# Patient Record
Sex: MALE | Race: WHITE | NOT HISPANIC OR LATINO | Employment: FULL TIME | URBAN - NONMETROPOLITAN AREA
[De-identification: names, ages, dates, MRNs, and addresses within clinical notes are randomized per-mention and may not be internally consistent; named-entity substitution may affect disease eponyms.]

---

## 2017-08-04 ENCOUNTER — HOSPITAL ENCOUNTER (EMERGENCY)
Facility: HOSPITAL | Age: 37
Discharge: HOME | End: 2017-08-05
Attending: EMERGENCY MEDICINE | Admitting: EMERGENCY MEDICINE

## 2017-08-04 DIAGNOSIS — T78.40XA ALLERGIC REACTION, INITIAL ENCOUNTER: Primary | ICD-10-CM

## 2017-08-04 PROCEDURE — 99284 EMERGENCY DEPT VISIT MOD MDM: CPT

## 2017-08-04 PROCEDURE — 96375 TX/PRO/DX INJ NEW DRUG ADDON: CPT

## 2017-08-04 PROCEDURE — 96374 THER/PROPH/DIAG INJ IV PUSH: CPT

## 2017-08-04 PROCEDURE — 25010000002 METHYLPREDNISOLONE PER 125 MG: Performed by: EMERGENCY MEDICINE

## 2017-08-04 PROCEDURE — 96361 HYDRATE IV INFUSION ADD-ON: CPT

## 2017-08-04 PROCEDURE — 25010000002 DIPHENHYDRAMINE PER 50 MG: Performed by: EMERGENCY MEDICINE

## 2017-08-04 RX ORDER — FAMOTIDINE 10 MG/ML
20 INJECTION, SOLUTION INTRAVENOUS ONCE
Status: COMPLETED | OUTPATIENT
Start: 2017-08-04 | End: 2017-08-04

## 2017-08-04 RX ORDER — DIPHENHYDRAMINE HYDROCHLORIDE 50 MG/ML
25 INJECTION INTRAMUSCULAR; INTRAVENOUS ONCE
Status: COMPLETED | OUTPATIENT
Start: 2017-08-04 | End: 2017-08-04

## 2017-08-04 RX ORDER — METHYLPREDNISOLONE SODIUM SUCCINATE 125 MG/2ML
125 INJECTION, POWDER, LYOPHILIZED, FOR SOLUTION INTRAMUSCULAR; INTRAVENOUS ONCE
Status: COMPLETED | OUTPATIENT
Start: 2017-08-04 | End: 2017-08-04

## 2017-08-04 RX ADMIN — SODIUM CHLORIDE 1000 ML: 9 INJECTION, SOLUTION INTRAVENOUS at 22:22

## 2017-08-04 RX ADMIN — FAMOTIDINE 20 MG: 10 INJECTION, SOLUTION INTRAVENOUS at 22:21

## 2017-08-04 RX ADMIN — DIPHENHYDRAMINE HYDROCHLORIDE 25 MG: 50 INJECTION, SOLUTION INTRAMUSCULAR; INTRAVENOUS at 22:21

## 2017-08-04 RX ADMIN — METHYLPREDNISOLONE SODIUM SUCCINATE 125 MG: 125 INJECTION, POWDER, FOR SOLUTION INTRAMUSCULAR; INTRAVENOUS at 22:21

## 2017-08-05 VITALS
RESPIRATION RATE: 18 BRPM | SYSTOLIC BLOOD PRESSURE: 107 MMHG | HEART RATE: 65 BPM | DIASTOLIC BLOOD PRESSURE: 78 MMHG | HEIGHT: 68 IN | WEIGHT: 150 LBS | BODY MASS INDEX: 22.73 KG/M2 | TEMPERATURE: 98.1 F | OXYGEN SATURATION: 99 %

## 2017-08-05 RX ORDER — PREDNISONE 20 MG/1
20 TABLET ORAL DAILY
Qty: 4 TABLET | Refills: 0 | OUTPATIENT
Start: 2017-08-05 | End: 2018-02-27

## 2017-08-05 NOTE — ED PROVIDER NOTES
TRIAGE CHIEF COMPLAINT:     Nursing and triage notes reviewed    Chief Complaint   Patient presents with   • Allergic Reaction      HPI: Chas Jj is a 37 y.o. male who presents to the emergency department complaining of An allergic reaction.  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  Patient states she has had allergic reactions to insulation in the past but states they've never been this bad.  Did not take any medications at home.  Denies chest pain.  Does describe some itching.  Denies other complaints currently.     REVIEW OF SYSTEMS: All other systems reviewed and are negative     PAST MEDICAL HISTORY:   History reviewed. No pertinent past medical history.     FAMILY HISTORY:   History reviewed. No pertinent family history.     SOCIAL HISTORY:   Social History     Social History   • Marital status: N/A     Spouse name: N/A   • Number of children: N/A   • Years of education: N/A     Occupational History   • Not on file.     Social History Main Topics   • Smoking status: Current Every Day Smoker     Packs/day: 0.50     Types: Cigarettes   • Smokeless tobacco: Not on file   • Alcohol use No   • Drug use: No   • Sexual activity: Not on file     Other Topics Concern   • Not on file     Social History Narrative   • No narrative on file        SURGICAL HISTORY:   Past Surgical History:   Procedure Laterality Date   • TOTAL HIP ARTHROPLASTY     • TOTAL KNEE ARTHROPLASTY          CURRENT MEDICATIONS:      Medication List      Notice     You have not been prescribed any medications.         ALLERGIES: Mushroom extract complex     PHYSICAL EXAM:   VITAL SIGNS:   Vitals:    08/04/17 2202   BP: 134/78   Pulse: 80   Resp: 20   Temp: 97.6 °F (36.4 °C)   SpO2: 99%      CONSTITUTIONAL: Awake, oriented, appears non-toxic   HENT: Atraumatic, normocephalic, oral mucosa pink and moist, airway  patent.   EYES: Conjunctiva clear  NECK: Trachea midline   CARDIOVASCULAR: Normal heart rate, Normal rhythm, No murmurs, rubs, gallops   PULMONARY/CHEST: Clear to auscultation, no rhonchi, wheezes, or rales. Symmetrical breath sounds   ABDOMINAL: Non-distended, soft, non-tender - no rebound or guarding   NEUROLOGIC: Non-focal, moving all four extremities, no gross sensory or motor deficits.   EXTREMITIES: No clubbing, cyanosis, or edema   SKIN: Warm, Dry, there are diffuse hives over the neck, trunk, back, upper and lower extremities.     ED COURSE / MEDICAL DECISION MAKING:   Chas Jj is a 37 y.o. male who presents to the emergency department for evaluation of an allergic reaction.  Patient arrives with stable vital signs.  Does not appear distressed.  Does have a large number of hives spread diffusely over his body.  No wheezing is appreciated, no respiratory compromise.  Patient treated with steroids, IV fluids, Benadryl, and Pepcid.  Patient had rapid resolution of his symptoms.  Patient observed several hours with no rebound of symptoms.  Patient feeling well at this point in time.  I think he is safe for discharged home.  We'll provide some steroids as an outpatient.  Return precautions discussed.    DECISION TO DISCHARGE/ADMIT: see ED care timeline     FINAL IMPRESSION:   1 -- allergic reaction   2 --   3 --     Electronically signed by: Neetu Avila MD, 8/4/2017 10:15 PM       Neetu Avila MD  08/05/17 0016

## 2017-08-28 ENCOUNTER — HOSPITAL ENCOUNTER (EMERGENCY)
Facility: HOSPITAL | Age: 37
Discharge: LEFT BEFORE DISCHARGE | End: 2017-08-28
Attending: EMERGENCY MEDICINE | Admitting: EMERGENCY MEDICINE

## 2017-08-28 VITALS
WEIGHT: 151 LBS | BODY MASS INDEX: 23.7 KG/M2 | TEMPERATURE: 98.2 F | RESPIRATION RATE: 16 BRPM | HEART RATE: 66 BPM | HEIGHT: 67 IN | OXYGEN SATURATION: 98 % | DIASTOLIC BLOOD PRESSURE: 79 MMHG | SYSTOLIC BLOOD PRESSURE: 112 MMHG

## 2017-08-28 DIAGNOSIS — F48.8 SYNCOPE, PSYCHOGENIC: Primary | ICD-10-CM

## 2017-08-28 LAB
ALBUMIN SERPL-MCNC: 4.4 G/DL (ref 3.5–5)
ALBUMIN/GLOB SERPL: 1.6 G/DL (ref 1–2)
ALP SERPL-CCNC: 89 U/L (ref 38–126)
ALT SERPL W P-5'-P-CCNC: 30 U/L (ref 13–69)
AMPHET+METHAMPHET UR QL: NEGATIVE
AMPHETAMINES UR QL: NEGATIVE
ANION GAP SERPL CALCULATED.3IONS-SCNC: 12.6 MMOL/L
AST SERPL-CCNC: 19 U/L (ref 15–46)
BARBITURATES UR QL SCN: NEGATIVE
BASOPHILS # BLD AUTO: 0.09 10*3/MM3 (ref 0–0.2)
BASOPHILS NFR BLD AUTO: 1.1 % (ref 0–2.5)
BENZODIAZ UR QL SCN: NEGATIVE
BILIRUB SERPL-MCNC: 0.4 MG/DL (ref 0.2–1.3)
BILIRUB UR QL STRIP: NEGATIVE
BUN BLD-MCNC: 14 MG/DL (ref 7–20)
BUN/CREAT SERPL: 15.6 (ref 6.3–21.9)
BUPRENORPHINE SERPL-MCNC: NEGATIVE NG/ML
CALCIUM SPEC-SCNC: 9 MG/DL (ref 8.4–10.2)
CANNABINOIDS SERPL QL: NEGATIVE
CHLORIDE SERPL-SCNC: 103 MMOL/L (ref 98–107)
CLARITY UR: CLEAR
CO2 SERPL-SCNC: 27 MMOL/L (ref 26–30)
COCAINE UR QL: NEGATIVE
COLOR UR: YELLOW
CREAT BLD-MCNC: 0.9 MG/DL (ref 0.6–1.3)
DEPRECATED RDW RBC AUTO: 44.9 FL (ref 37–54)
EOSINOPHIL # BLD AUTO: 0.17 10*3/MM3 (ref 0–0.7)
EOSINOPHIL NFR BLD AUTO: 2 % (ref 0–7)
ERYTHROCYTE [DISTWIDTH] IN BLOOD BY AUTOMATED COUNT: 13.4 % (ref 11.5–14.5)
ETHANOL BLD-MCNC: <10 MG/DL
ETHANOL UR QL: <0.01 %
GFR SERPL CREATININE-BSD FRML MDRD: 95 ML/MIN/1.73
GLOBULIN UR ELPH-MCNC: 2.7 GM/DL
GLUCOSE BLD-MCNC: 94 MG/DL (ref 74–98)
GLUCOSE UR STRIP-MCNC: NEGATIVE MG/DL
HCT VFR BLD AUTO: 42.7 % (ref 42–52)
HGB BLD-MCNC: 14.2 G/DL (ref 14–18)
HGB UR QL STRIP.AUTO: NEGATIVE
HOLD SPECIMEN: NORMAL
HOLD SPECIMEN: NORMAL
IMM GRANULOCYTES # BLD: 0.02 10*3/MM3 (ref 0–0.06)
IMM GRANULOCYTES NFR BLD: 0.2 % (ref 0–0.6)
KETONES UR QL STRIP: ABNORMAL
LEUKOCYTE ESTERASE UR QL STRIP.AUTO: NEGATIVE
LYMPHOCYTES # BLD AUTO: 2.25 10*3/MM3 (ref 0.6–3.4)
LYMPHOCYTES NFR BLD AUTO: 26.8 % (ref 10–50)
MAGNESIUM SERPL-MCNC: 2 MG/DL (ref 1.6–2.3)
MCH RBC QN AUTO: 30 PG (ref 27–31)
MCHC RBC AUTO-ENTMCNC: 33.3 G/DL (ref 30–37)
MCV RBC AUTO: 90.3 FL (ref 80–94)
METHADONE UR QL SCN: NEGATIVE
MONOCYTES # BLD AUTO: 0.8 10*3/MM3 (ref 0–0.9)
MONOCYTES NFR BLD AUTO: 9.5 % (ref 0–12)
NEUTROPHILS # BLD AUTO: 5.06 10*3/MM3 (ref 2–6.9)
NEUTROPHILS NFR BLD AUTO: 60.4 % (ref 37–80)
NITRITE UR QL STRIP: NEGATIVE
NRBC BLD MANUAL-RTO: 0 /100 WBC (ref 0–0)
OPIATES UR QL: NEGATIVE
OXYCODONE UR QL SCN: NEGATIVE
PCP UR QL SCN: NEGATIVE
PH UR STRIP.AUTO: 6 [PH] (ref 5–8)
PLATELET # BLD AUTO: 195 10*3/MM3 (ref 130–400)
PMV BLD AUTO: 12.6 FL (ref 6–12)
POTASSIUM BLD-SCNC: 3.6 MMOL/L (ref 3.5–5.1)
PROPOXYPH UR QL: NEGATIVE
PROT SERPL-MCNC: 7.1 G/DL (ref 6.3–8.2)
PROT UR QL STRIP: NEGATIVE
RBC # BLD AUTO: 4.73 10*6/MM3 (ref 4.7–6.1)
SODIUM BLD-SCNC: 139 MMOL/L (ref 137–145)
SP GR UR STRIP: 1.01 (ref 1–1.03)
TRICYCLICS UR QL SCN: NEGATIVE
TROPONIN I SERPL-MCNC: <0.012 NG/ML (ref 0–0.03)
UROBILINOGEN UR QL STRIP: ABNORMAL
WBC NRBC COR # BLD: 8.39 10*3/MM3 (ref 4.8–10.8)
WHOLE BLOOD HOLD SPECIMEN: NORMAL
WHOLE BLOOD HOLD SPECIMEN: NORMAL

## 2017-08-28 PROCEDURE — 84484 ASSAY OF TROPONIN QUANT: CPT | Performed by: EMERGENCY MEDICINE

## 2017-08-28 PROCEDURE — 99284 EMERGENCY DEPT VISIT MOD MDM: CPT

## 2017-08-28 PROCEDURE — 80307 DRUG TEST PRSMV CHEM ANLYZR: CPT | Performed by: EMERGENCY MEDICINE

## 2017-08-28 PROCEDURE — 85025 COMPLETE CBC W/AUTO DIFF WBC: CPT | Performed by: EMERGENCY MEDICINE

## 2017-08-28 PROCEDURE — 83735 ASSAY OF MAGNESIUM: CPT | Performed by: EMERGENCY MEDICINE

## 2017-08-28 PROCEDURE — 36415 COLL VENOUS BLD VENIPUNCTURE: CPT

## 2017-08-28 PROCEDURE — 80053 COMPREHEN METABOLIC PANEL: CPT | Performed by: EMERGENCY MEDICINE

## 2017-08-28 PROCEDURE — 81003 URINALYSIS AUTO W/O SCOPE: CPT | Performed by: EMERGENCY MEDICINE

## 2017-08-28 PROCEDURE — 93005 ELECTROCARDIOGRAM TRACING: CPT

## 2017-08-28 PROCEDURE — 80306 DRUG TEST PRSMV INSTRMNT: CPT | Performed by: EMERGENCY MEDICINE

## 2017-08-28 RX ORDER — SODIUM CHLORIDE 0.9 % (FLUSH) 0.9 %
10 SYRINGE (ML) INJECTION AS NEEDED
Status: DISCONTINUED | OUTPATIENT
Start: 2017-08-28 | End: 2017-08-28

## 2017-09-22 ENCOUNTER — HOSPITAL ENCOUNTER (EMERGENCY)
Facility: HOSPITAL | Age: 37
Discharge: ANOTHER HOSPITAL | End: 2017-09-22
Attending: EMERGENCY MEDICINE | Admitting: EMERGENCY MEDICINE

## 2017-09-22 VITALS
BODY MASS INDEX: 22.13 KG/M2 | RESPIRATION RATE: 17 BRPM | HEIGHT: 67 IN | TEMPERATURE: 98 F | HEART RATE: 58 BPM | OXYGEN SATURATION: 99 % | WEIGHT: 141 LBS | DIASTOLIC BLOOD PRESSURE: 85 MMHG | SYSTOLIC BLOOD PRESSURE: 125 MMHG

## 2017-09-22 DIAGNOSIS — H16.133 PHOTOKERATITIS OF BOTH EYES: Primary | ICD-10-CM

## 2017-09-22 PROCEDURE — 99283 EMERGENCY DEPT VISIT LOW MDM: CPT

## 2017-09-22 RX ORDER — ERYTHROMYCIN 5 MG/G
1 OINTMENT OPHTHALMIC ONCE
Status: COMPLETED | OUTPATIENT
Start: 2017-09-22 | End: 2017-09-22

## 2017-09-22 RX ORDER — TETRACAINE HYDROCHLORIDE 5 MG/ML
2 SOLUTION OPHTHALMIC ONCE
Status: COMPLETED | OUTPATIENT
Start: 2017-09-22 | End: 2017-09-22

## 2017-09-22 RX ORDER — OXYCODONE HYDROCHLORIDE AND ACETAMINOPHEN 5; 325 MG/1; MG/1
2 TABLET ORAL ONCE
Status: COMPLETED | OUTPATIENT
Start: 2017-09-22 | End: 2017-09-22

## 2017-09-22 RX ADMIN — ERYTHROMYCIN 1 APPLICATION: 5 OINTMENT OPHTHALMIC at 12:35

## 2017-09-22 RX ADMIN — TETRACAINE HYDROCHLORIDE 2 DROP: 5 SOLUTION OPHTHALMIC at 10:28

## 2017-09-22 RX ADMIN — OXYCODONE AND ACETAMINOPHEN 2 TABLET: 5; 325 TABLET ORAL at 10:29

## 2017-09-22 NOTE — ED PROVIDER NOTES
Subjective   History of Present Illness  TRIAGE CHIEF COMPLAINT:   Chief Complaint   Patient presents with   • Eye Trauma         HPI: Chas Jj   is a 37 y.o. male   who presents to the emergency department complaining of Eye pain.  Patient with history of CAD, active smoker, bipolar disorder.  Was at work when an  one off in front of his face causing a flash of severe brightness and heat.  Patient states he sustained instantaneous 's burn of both of his eyes and now has severe bilateral eye pain such that he is unable to open his eyes.  Pain is worsened by any exposure to light.  No other complaints.           Review of Systems   All other systems reviewed and are negative.      Past Medical History:   Diagnosis Date   • Bipolar 1 disorder    • Myocardial infarction        Allergies   Allergen Reactions   • Mushroom Extract Complex Anaphylaxis       Past Surgical History:   Procedure Laterality Date   • TOTAL HIP ARTHROPLASTY     • TOTAL KNEE ARTHROPLASTY         History reviewed. No pertinent family history.    Social History     Social History   • Marital status: Single     Spouse name: N/A   • Number of children: N/A   • Years of education: N/A     Social History Main Topics   • Smoking status: Current Every Day Smoker     Packs/day: 0.50     Types: Cigarettes   • Smokeless tobacco: None   • Alcohol use No   • Drug use: No   • Sexual activity: Not Asked     Other Topics Concern   • None     Social History Narrative           Objective   Physical Exam    CONSTITUTIONAL: Awake, oriented, appears in pain but non-toxic   HENT: Atraumatic, normocephalic, oral mucosa pink and moist, airway patent. Nares patent without drainage. External ears normal.   EYES: Conjunctiva injected. Perrla, eomi, +severe photophobia. + excess lacrimation. Right eye with impaired vision, only able to see my arm waving a few feet in front of his face.   NECK: Trachea midline, non-tender, supple   CARDIOVASCULAR:  Normal heart rate, Normal rhythm, No murmurs, rubs, gallops   PULMONARY/CHEST: Clear to auscultation, no rhonchi, wheezes, or rales. Symmetrical breath sounds. Non-tender.   ABDOMINAL: Non-distended, soft, non-tender - no rebound or guarding. BS normal.   NEUROLOGIC: Non-focal, moving all four extremities, no gross sensory or motor deficits.   EXTREMITIES: No clubbing, cyanosis, or edema   SKIN: Warm, Dry, No erythema, No rash     No orders to display           EKG:         Procedures         ED Course  ED Course          ED COURSE / MEDICAL DECISION MAKING:   Nursing notes reviewed.    Patient with a severe case of 's photokeratitis.  I'm concerned about the extent of visual impairment in his right eye.  Case discussed with Dr. Alfredo of  ophthalmology who has agreed to accept the patient as an ER to ER transfer.  Patient will go there via private vehicle with a family member driving.    DECISION TO DISCHARGE/ADMIT: see ED care timeline       Electronically signed by: Jeffery Murray MD, 9/22/2017 12:18 PM              [facility]    Final diagnoses:   Photokeratitis of both eyes            Jeffery Murray MD  09/22/17 1227

## 2018-02-26 ENCOUNTER — HOSPITAL ENCOUNTER (OUTPATIENT)
Dept: GENERAL RADIOLOGY | Facility: HOSPITAL | Age: 38
Discharge: HOME | End: 2018-02-26

## 2018-02-26 ENCOUNTER — HOSPITAL ENCOUNTER (OUTPATIENT)
Dept: GENERAL RADIOLOGY | Facility: HOSPITAL | Age: 38
Discharge: HOME | End: 2018-02-26
Admitting: PHYSICIAN ASSISTANT

## 2018-02-26 ENCOUNTER — TRANSCRIBE ORDERS (OUTPATIENT)
Dept: GENERAL RADIOLOGY | Facility: HOSPITAL | Age: 38
End: 2018-02-26

## 2018-02-26 DIAGNOSIS — M25.561 RIGHT KNEE PAIN, UNSPECIFIED CHRONICITY: ICD-10-CM

## 2018-02-26 DIAGNOSIS — M25.551 RIGHT HIP PAIN: ICD-10-CM

## 2018-02-26 DIAGNOSIS — M25.561 RIGHT KNEE PAIN, UNSPECIFIED CHRONICITY: Primary | ICD-10-CM

## 2018-02-26 PROCEDURE — 73560 X-RAY EXAM OF KNEE 1 OR 2: CPT

## 2018-02-26 PROCEDURE — 73502 X-RAY EXAM HIP UNI 2-3 VIEWS: CPT

## 2018-02-26 PROCEDURE — 73552 X-RAY EXAM OF FEMUR 2/>: CPT

## 2019-01-31 ENCOUNTER — APPOINTMENT (OUTPATIENT)
Dept: GENERAL RADIOLOGY | Facility: HOSPITAL | Age: 39
End: 2019-01-31

## 2019-01-31 ENCOUNTER — HOSPITAL ENCOUNTER (EMERGENCY)
Facility: HOSPITAL | Age: 39
Discharge: HOME | End: 2019-01-31
Attending: EMERGENCY MEDICINE | Admitting: EMERGENCY MEDICINE

## 2019-01-31 VITALS
HEART RATE: 86 BPM | SYSTOLIC BLOOD PRESSURE: 141 MMHG | TEMPERATURE: 97.8 F | DIASTOLIC BLOOD PRESSURE: 94 MMHG | BODY MASS INDEX: 23.83 KG/M2 | RESPIRATION RATE: 18 BRPM | WEIGHT: 151.8 LBS | OXYGEN SATURATION: 99 % | HEIGHT: 67 IN

## 2019-01-31 DIAGNOSIS — M79.641 RIGHT HAND PAIN: Primary | ICD-10-CM

## 2019-01-31 PROCEDURE — 73130 X-RAY EXAM OF HAND: CPT

## 2019-01-31 PROCEDURE — 99282 EMERGENCY DEPT VISIT SF MDM: CPT

## 2019-01-31 RX ORDER — SULFAMETHOXAZOLE AND TRIMETHOPRIM 800; 160 MG/1; MG/1
1 TABLET ORAL 2 TIMES DAILY
Qty: 14 TABLET | Refills: 0 | Status: SHIPPED | OUTPATIENT
Start: 2019-01-31 | End: 2019-02-07

## 2019-01-31 RX ORDER — CEPHALEXIN 500 MG/1
500 CAPSULE ORAL 2 TIMES DAILY
Qty: 14 CAPSULE | Refills: 0 | Status: SHIPPED | OUTPATIENT
Start: 2019-01-31 | End: 2019-02-07

## 2019-04-30 ENCOUNTER — HOSPITAL ENCOUNTER (EMERGENCY)
Facility: HOSPITAL | Age: 39
Discharge: HOME | End: 2019-04-30
Attending: EMERGENCY MEDICINE | Admitting: EMERGENCY MEDICINE

## 2019-04-30 ENCOUNTER — APPOINTMENT (OUTPATIENT)
Dept: CT IMAGING | Facility: HOSPITAL | Age: 39
End: 2019-04-30

## 2019-04-30 VITALS
SYSTOLIC BLOOD PRESSURE: 111 MMHG | HEIGHT: 67 IN | WEIGHT: 153 LBS | DIASTOLIC BLOOD PRESSURE: 71 MMHG | RESPIRATION RATE: 20 BRPM | OXYGEN SATURATION: 99 % | HEART RATE: 79 BPM | BODY MASS INDEX: 24.01 KG/M2 | TEMPERATURE: 98.1 F

## 2019-04-30 DIAGNOSIS — R10.9 ACUTE RIGHT FLANK PAIN: Primary | ICD-10-CM

## 2019-04-30 DIAGNOSIS — N23 RENAL COLIC ON RIGHT SIDE: ICD-10-CM

## 2019-04-30 LAB
ALBUMIN SERPL-MCNC: 4.4 G/DL (ref 3.5–5)
ALBUMIN/GLOB SERPL: 1.4 G/DL (ref 1–2)
ALP SERPL-CCNC: 91 U/L (ref 38–126)
ALT SERPL W P-5'-P-CCNC: 66 U/L (ref 13–69)
ANION GAP SERPL CALCULATED.3IONS-SCNC: 13 MMOL/L (ref 10–20)
AST SERPL-CCNC: 47 U/L (ref 15–46)
BASOPHILS # BLD AUTO: 0.1 10*3/MM3 (ref 0–0.2)
BASOPHILS NFR BLD AUTO: 0.8 % (ref 0–1.5)
BILIRUB SERPL-MCNC: 0.2 MG/DL (ref 0.2–1.3)
BILIRUB UR QL STRIP: NEGATIVE
BUN BLD-MCNC: 15 MG/DL (ref 7–20)
BUN/CREAT SERPL: 16.7 (ref 6.3–21.9)
CALCIUM SPEC-SCNC: 9.1 MG/DL (ref 8.4–10.2)
CHLORIDE SERPL-SCNC: 100 MMOL/L (ref 98–107)
CLARITY UR: CLEAR
CO2 SERPL-SCNC: 30 MMOL/L (ref 26–30)
COLOR UR: ABNORMAL
CREAT BLD-MCNC: 0.9 MG/DL (ref 0.6–1.3)
DEPRECATED RDW RBC AUTO: 46.8 FL (ref 37–54)
EOSINOPHIL # BLD AUTO: 0.37 10*3/MM3 (ref 0–0.4)
EOSINOPHIL NFR BLD AUTO: 3 % (ref 0.3–6.2)
ERYTHROCYTE [DISTWIDTH] IN BLOOD BY AUTOMATED COUNT: 13.8 % (ref 12.3–15.4)
GFR SERPL CREATININE-BSD FRML MDRD: 94 ML/MIN/1.73
GLOBULIN UR ELPH-MCNC: 3.2 GM/DL
GLUCOSE BLD-MCNC: 92 MG/DL (ref 74–98)
GLUCOSE UR STRIP-MCNC: NEGATIVE MG/DL
HCT VFR BLD AUTO: 47.7 % (ref 37.5–51)
HGB BLD-MCNC: 16.1 G/DL (ref 13–17.7)
HGB UR QL STRIP.AUTO: NEGATIVE
IMM GRANULOCYTES # BLD AUTO: 0.04 10*3/MM3 (ref 0–0.05)
IMM GRANULOCYTES NFR BLD AUTO: 0.3 % (ref 0–0.5)
KETONES UR QL STRIP: ABNORMAL
LEUKOCYTE ESTERASE UR QL STRIP.AUTO: NEGATIVE
LIPASE SERPL-CCNC: 513 U/L (ref 23–300)
LYMPHOCYTES # BLD AUTO: 3.17 10*3/MM3 (ref 0.7–3.1)
LYMPHOCYTES NFR BLD AUTO: 25.5 % (ref 19.6–45.3)
MCH RBC QN AUTO: 31 PG (ref 26.6–33)
MCHC RBC AUTO-ENTMCNC: 33.8 G/DL (ref 31.5–35.7)
MCV RBC AUTO: 91.9 FL (ref 79–97)
MONOCYTES # BLD AUTO: 0.82 10*3/MM3 (ref 0.1–0.9)
MONOCYTES NFR BLD AUTO: 6.6 % (ref 5–12)
NEUTROPHILS # BLD AUTO: 7.93 10*3/MM3 (ref 1.7–7)
NEUTROPHILS NFR BLD AUTO: 63.8 % (ref 42.7–76)
NITRITE UR QL STRIP: NEGATIVE
NRBC BLD AUTO-RTO: 0 /100 WBC (ref 0–0.2)
PH UR STRIP.AUTO: <=5 [PH] (ref 5–8)
PLATELET # BLD AUTO: 213 10*3/MM3 (ref 140–450)
PMV BLD AUTO: 11.5 FL (ref 6–12)
POTASSIUM BLD-SCNC: 4 MMOL/L (ref 3.5–5.1)
PROT SERPL-MCNC: 7.6 G/DL (ref 6.3–8.2)
PROT UR QL STRIP: NEGATIVE
RBC # BLD AUTO: 5.19 10*6/MM3 (ref 4.14–5.8)
SODIUM BLD-SCNC: 139 MMOL/L (ref 137–145)
SP GR UR STRIP: >=1.03 (ref 1–1.03)
UROBILINOGEN UR QL STRIP: ABNORMAL
WBC NRBC COR # BLD: 12.43 10*3/MM3 (ref 3.4–10.8)

## 2019-04-30 PROCEDURE — 74176 CT ABD & PELVIS W/O CONTRAST: CPT

## 2019-04-30 PROCEDURE — 25010000002 KETOROLAC TROMETHAMINE PER 15 MG: Performed by: PHYSICIAN ASSISTANT

## 2019-04-30 PROCEDURE — 83690 ASSAY OF LIPASE: CPT | Performed by: PHYSICIAN ASSISTANT

## 2019-04-30 PROCEDURE — 81003 URINALYSIS AUTO W/O SCOPE: CPT | Performed by: PHYSICIAN ASSISTANT

## 2019-04-30 PROCEDURE — 80053 COMPREHEN METABOLIC PANEL: CPT | Performed by: PHYSICIAN ASSISTANT

## 2019-04-30 PROCEDURE — 96375 TX/PRO/DX INJ NEW DRUG ADDON: CPT

## 2019-04-30 PROCEDURE — 99284 EMERGENCY DEPT VISIT MOD MDM: CPT

## 2019-04-30 PROCEDURE — 85025 COMPLETE CBC W/AUTO DIFF WBC: CPT | Performed by: PHYSICIAN ASSISTANT

## 2019-04-30 PROCEDURE — 96374 THER/PROPH/DIAG INJ IV PUSH: CPT

## 2019-04-30 PROCEDURE — 25010000002 ONDANSETRON PER 1 MG: Performed by: PHYSICIAN ASSISTANT

## 2019-04-30 RX ORDER — ONDANSETRON 2 MG/ML
4 INJECTION INTRAMUSCULAR; INTRAVENOUS ONCE
Status: COMPLETED | OUTPATIENT
Start: 2019-04-30 | End: 2019-04-30

## 2019-04-30 RX ORDER — KETOROLAC TROMETHAMINE 30 MG/ML
30 INJECTION, SOLUTION INTRAMUSCULAR; INTRAVENOUS ONCE
Status: COMPLETED | OUTPATIENT
Start: 2019-04-30 | End: 2019-04-30

## 2019-04-30 RX ORDER — SODIUM CHLORIDE 0.9 % (FLUSH) 0.9 %
10 SYRINGE (ML) INJECTION AS NEEDED
Status: DISCONTINUED | OUTPATIENT
Start: 2019-04-30 | End: 2019-05-01 | Stop reason: HOSPADM

## 2019-04-30 RX ADMIN — ONDANSETRON 4 MG: 2 INJECTION INTRAMUSCULAR; INTRAVENOUS at 21:17

## 2019-04-30 RX ADMIN — KETOROLAC TROMETHAMINE 30 MG: 30 INJECTION, SOLUTION INTRAMUSCULAR at 21:18

## 2019-04-30 RX ADMIN — SODIUM CHLORIDE 1000 ML: 9 INJECTION, SOLUTION INTRAVENOUS at 21:14

## 2019-05-01 NOTE — ED PROVIDER NOTES
"Subjective   Mr. Devan Jj is a 38yoM who presents to the emergency department complaining of RUQ pain. History was difficult to obtain 2/2 patient's pain.  Patient states he started experiencing RUQ pain and LLQ pain three days ago. He states he went to a medical facility in [place] yesterday where he was diagnosed with a \"torn muscle\" in his LLQ and gallstones. Patient is unsure of the imaging they did, and is unsure if he was diagnosed with cholecystitis, and is unsure of any follow up referrals. Patient was prescribed diclofenac and cyclobenzaprine, and has taken those today with no alleviation of symptoms.  Patient c/o chest pain that started 5 minutes ago that he describes as pressure. He denies radiation of this pain and states it worsens with inspiration. His nurse notes that the patient told her his pain was decreasing after I left the room and suggested his chest pain might have been \"gas.\" Patient denies fever, constipation, diarrhea, vomiting, jaundice, nausea. He states the pain goes from his right mid to lower back and wraps around to the front abdominal area and groin area. He states he was told by [place] if the pain worsened to \"come back to the hospital\". The patient came to this hospital. He has a history of an AMI and bipolar 1 disorder. He states he was \"ringing someone up at work\" when the pain started. He denies history of kidney stones but his father states he has had kidney stones in the past. He denies any fever or chills. He has no other complaints.             Review of Systems   Constitutional: Positive for activity change and appetite change. Negative for chills, fatigue and fever.   Respiratory: Positive for chest tightness (with inspiration). Negative for shortness of breath and wheezing.    Cardiovascular: Negative for chest pain, palpitations and leg swelling.   Gastrointestinal: Positive for abdominal pain (right side; radiation from the right mid-lower back). Negative for " constipation, diarrhea, nausea and vomiting.   Genitourinary: Positive for flank pain (right). Negative for decreased urine volume, difficulty urinating, frequency, hematuria, penile pain, penile swelling, scrotal swelling, testicular pain and urgency.   Musculoskeletal: Positive for back pain. Negative for arthralgias, gait problem, joint swelling, myalgias and neck pain.   Skin: Negative for color change and rash.   Neurological: Negative for dizziness, syncope, light-headedness and headaches.       Past Medical History:   Diagnosis Date   • Bipolar 1 disorder (CMS/[facility])    • Myocardial infarction (CMS/[facility])        Allergies   Allergen Reactions   • Mushroom Extract Complex Anaphylaxis       Past Surgical History:   Procedure Laterality Date   • TOTAL HIP ARTHROPLASTY     • TOTAL KNEE ARTHROPLASTY         History reviewed. No pertinent family history.    Social History     Socioeconomic History   • Marital status: Single     Spouse name: Not on file   • Number of children: Not on file   • Years of education: Not on file   • Highest education level: Not on file   Tobacco Use   • Smoking status: Current Every Day Smoker     Packs/day: 0.50     Types: Cigarettes   Substance and Sexual Activity   • Alcohol use: No   • Drug use: No           Objective   Physical Exam   Constitutional: He is oriented to person, place, and time. He appears well-developed and well-nourished.  Non-toxic appearance. He does not have a sickly appearance. He appears ill. No distress.   HENT:   Head: Normocephalic and atraumatic.   Right Ear: Hearing and external ear normal.   Left Ear: Hearing and external ear normal.   Nose: Nose normal.   Mouth/Throat: Uvula is midline, oropharynx is clear and moist and mucous membranes are normal.   Eyes: Conjunctivae, EOM and lids are normal.   Neck: Normal range of motion. Neck supple.   Cardiovascular: Normal rate, regular rhythm, intact distal pulses and normal pulses.   Pulmonary/Chest: Effort normal  and breath sounds normal. No respiratory distress. He has no decreased breath sounds. He has no wheezes.   Abdominal: Soft. Normal appearance and bowel sounds are normal. There is tenderness in the right lower quadrant. There is CVA tenderness (right).       Musculoskeletal: Normal range of motion. He exhibits no edema or deformity.   Neurological: He is alert and oriented to person, place, and time. He has normal strength. Coordination and gait normal. GCS eye subscore is 4. GCS verbal subscore is 5. GCS motor subscore is 6.   Skin: Skin is warm, dry and intact. Capillary refill takes less than 2 seconds. No rash noted. No pallor.   Psychiatric: His mood appears anxious. His speech is rapid and/or pressured. He is agitated.   Nursing note and vitals reviewed.      Procedures           ED Course      Labs, IVF, toradol, zofran, UA ordered.      Results   CT Abdomen Pelvis Stone Protocol (Order [id number])   4/30/2019 11:26 PM - Interface, Rad Results [place] [facility]     Narrative     FINAL REPORT    TECHNIQUE:  Helically acquired noncontrast axial multidetector CT images  were obtained from the lung bases through the pelvis. Dose  reduction techniques to achieve ALARA were employed.    CLINICAL HISTORY:  Flank pain, stone disease suspected    FINDINGS:  The lung bases are clear.  The liver, spleen, adrenal glands,  kidneys, pancreas and visualized portions of the bowel are  unremarkable.  No significant osseous abnormalities are  identified.   Impression     No acute intraabdominal abnormality.    Authenticated by Yang Jordan MD on 04/30/2019 11:25:05       11:35 PM-patient does not have any evidence of gallstones or kidney stones or ureteral stones.  I will discharge him home.  He may use ibuprofen but he has diclofenac at that has been prescribed for him as well as a muscle relaxer.  He needs to follow-up with a primary care physician I will give him the information of the [facility] and he can  follow-up with surgery number of Dr. Moser who can discuss the results of his  CT scan that was done in [place] and his gallstone issue.    Lab Results (last 24 hours)     Procedure Component Value Units Date/Time    CBC & Differential [[id number]] Collected:  04/30/19 2112    Specimen:  Blood Updated:  04/30/19 2118    Narrative:       The following orders were created for panel order CBC & Differential.  Procedure                               Abnormality         Status                     ---------                               -----------         ------                     CBC Auto Differential[[id number]]        Abnormal            Final result                 Please view results for these tests on the individual orders.    Comprehensive Metabolic Panel [[id number]]  (Abnormal) Collected:  04/30/19 2112    Specimen:  Blood Updated:  04/30/19 2131     Glucose 92 mg/dL      BUN 15 mg/dL      Creatinine 0.90 mg/dL      Sodium 139 mmol/L      Potassium 4.0 mmol/L      Chloride 100 mmol/L      CO2 30.0 mmol/L      Calcium 9.1 mg/dL      Total Protein 7.6 g/dL      Albumin 4.40 g/dL      ALT (SGPT) 66 U/L      AST (SGOT) 47 U/L      Alkaline Phosphatase 91 U/L      Total Bilirubin 0.2 mg/dL      eGFR Non African Amer 94 mL/min/1.73      Globulin 3.2 gm/dL      A/G Ratio 1.4 g/dL      BUN/Creatinine Ratio 16.7     Anion Gap 13.0 mmol/L     Narrative:       GFR Normal >60  Chronic Kidney Disease <60  Kidney Failure <15    Lipase [[id number]]  (Abnormal) Collected:  04/30/19 2112    Specimen:  Blood Updated:  04/30/19 2131     Lipase 513 U/L     CBC Auto Differential [[id number]]  (Abnormal) Collected:  04/30/19 2112    Specimen:  Blood Updated:  04/30/19 2118     WBC 12.43 10*3/mm3      RBC 5.19 10*6/mm3      Hemoglobin 16.1 g/dL      Hematocrit 47.7 %      MCV 91.9 fL      MCH 31.0 pg      MCHC 33.8 g/dL      RDW 13.8 %      RDW-SD 46.8 fl      MPV 11.5 fL      Platelets 213 10*3/mm3      Neutrophil % 63.8 %      Lymphocyte  % 25.5 %      Monocyte % 6.6 %      Eosinophil % 3.0 %      Basophil % 0.8 %      Immature Grans % 0.3 %      Neutrophils, Absolute 7.93 10*3/mm3      Lymphocytes, Absolute 3.17 10*3/mm3      Monocytes, Absolute 0.82 10*3/mm3      Eosinophils, Absolute 0.37 10*3/mm3      Basophils, Absolute 0.10 10*3/mm3      Immature Grans, Absolute 0.04 10*3/mm3      nRBC 0.0 /100 WBC     Urinalysis With Culture If Indicated - Urine, Clean Catch [[id number]]  (Abnormal) Collected:  04/30/19 2139    Specimen:  Urine, Clean Catch Updated:  04/30/19 2149     Color, UA Dark Yellow     Appearance, UA Clear     pH, UA <=5.0     Specific Gravity, UA >=1.030     Glucose, UA Negative     Ketones, UA Trace     Bilirubin, UA Negative     Blood, UA Negative     Protein, UA Negative     Leuk Esterase, UA Negative     Nitrite, UA Negative     Urobilinogen, UA 1.0 E.U./dL    Narrative:       Urine microscopic not indicated.            MDM  Number of Diagnoses or Management Options  Acute right flank pain: new and requires workup  Renal colic on right side: new and requires workup     Amount and/or Complexity of Data Reviewed  Clinical lab tests: reviewed and ordered  Tests in the radiology section of CPT®: reviewed and ordered  Independent visualization of images, tracings, or specimens: yes    Risk of Complications, Morbidity, and/or Mortality  Presenting problems: moderate  Diagnostic procedures: moderate  Management options: low    Patient Progress  Patient progress: improved    I have reviewed all labs, and all imaging that has been ordered for this patient.  I have discussed the results of this testing with the patient.  Together the patient, their family and I discussed the plan for treatment and disposition.    The patient verbalized understanding.   Red flags, indicating immediate need to return to the emergency room, were discussed with the patient and/or the patient's family and they verbalized understanding.  The patient and/or the  family were encouraged to return to the emergency room for any worsening or concerning symptoms.      Final diagnoses:   Acute right flank pain   Renal colic on right side            Davina Mcgill PA-C  04/30/19 2936

## 2020-09-09 ENCOUNTER — TRANSCRIBE ORDERS (OUTPATIENT)
Dept: ADMINISTRATIVE | Facility: HOSPITAL | Age: 40
End: 2020-09-09

## 2020-09-09 DIAGNOSIS — R10.32 ABDOMINAL PAIN, LEFT LOWER QUADRANT: Primary | ICD-10-CM

## 2020-09-22 ENCOUNTER — HOSPITAL ENCOUNTER (OUTPATIENT)
Dept: ULTRASOUND IMAGING | Facility: HOSPITAL | Age: 40
Discharge: HOME | End: 2020-09-22
Admitting: NURSE PRACTITIONER

## 2020-09-22 DIAGNOSIS — R10.32 ABDOMINAL PAIN, LEFT LOWER QUADRANT: ICD-10-CM

## 2020-09-22 PROCEDURE — 76882 US LMTD JT/FCL EVL NVASC XTR: CPT

## 2020-09-29 ENCOUNTER — TRANSCRIBE ORDERS (OUTPATIENT)
Dept: ADMINISTRATIVE | Facility: HOSPITAL | Age: 40
End: 2020-09-29

## 2020-09-29 DIAGNOSIS — R10.32 ABDOMINAL PAIN, LEFT LOWER QUADRANT: Primary | ICD-10-CM

## 2020-10-09 ENCOUNTER — HOSPITAL ENCOUNTER (OUTPATIENT)
Dept: CT IMAGING | Facility: HOSPITAL | Age: 40
Discharge: HOME | End: 2020-10-09
Admitting: NURSE PRACTITIONER

## 2020-10-09 DIAGNOSIS — R10.32 ABDOMINAL PAIN, LEFT LOWER QUADRANT: ICD-10-CM

## 2020-10-09 PROCEDURE — 74176 CT ABD & PELVIS W/O CONTRAST: CPT

## 2021-01-20 ENCOUNTER — APPOINTMENT (OUTPATIENT)
Dept: GENERAL RADIOLOGY | Facility: HOSPITAL | Age: 41
End: 2021-01-20

## 2021-01-20 ENCOUNTER — HOSPITAL ENCOUNTER (EMERGENCY)
Facility: HOSPITAL | Age: 41
Discharge: HOME | End: 2021-01-20
Attending: EMERGENCY MEDICINE | Admitting: EMERGENCY MEDICINE

## 2021-01-20 VITALS
RESPIRATION RATE: 16 BRPM | DIASTOLIC BLOOD PRESSURE: 81 MMHG | WEIGHT: 156.4 LBS | SYSTOLIC BLOOD PRESSURE: 135 MMHG | TEMPERATURE: 98.4 F | HEIGHT: 68 IN | OXYGEN SATURATION: 98 % | BODY MASS INDEX: 23.7 KG/M2 | HEART RATE: 82 BPM

## 2021-01-20 DIAGNOSIS — S61.011A LACERATION OF RIGHT THUMB WITHOUT FOREIGN BODY WITHOUT DAMAGE TO NAIL, INITIAL ENCOUNTER: Primary | ICD-10-CM

## 2021-01-20 PROCEDURE — 90471 IMMUNIZATION ADMIN: CPT | Performed by: EMERGENCY MEDICINE

## 2021-01-20 PROCEDURE — 99283 EMERGENCY DEPT VISIT LOW MDM: CPT

## 2021-01-20 PROCEDURE — 25010000002 TDAP 5-2.5-18.5 LF-MCG/0.5 SUSPENSION: Performed by: EMERGENCY MEDICINE

## 2021-01-20 PROCEDURE — 25010000003 LIDOCAINE 1 % SOLUTION: Performed by: EMERGENCY MEDICINE

## 2021-01-20 PROCEDURE — 73130 X-RAY EXAM OF HAND: CPT

## 2021-01-20 PROCEDURE — 90715 TDAP VACCINE 7 YRS/> IM: CPT | Performed by: EMERGENCY MEDICINE

## 2021-01-20 RX ORDER — AMOXICILLIN AND CLAVULANATE POTASSIUM 875; 125 MG/1; MG/1
1 TABLET, FILM COATED ORAL ONCE
Status: COMPLETED | OUTPATIENT
Start: 2021-01-20 | End: 2021-01-20

## 2021-01-20 RX ORDER — LIDOCAINE HYDROCHLORIDE 10 MG/ML
10 INJECTION, SOLUTION INFILTRATION; PERINEURAL ONCE
Status: COMPLETED | OUTPATIENT
Start: 2021-01-20 | End: 2021-01-20

## 2021-01-20 RX ORDER — AMOXICILLIN AND CLAVULANATE POTASSIUM 875; 125 MG/1; MG/1
1 TABLET, FILM COATED ORAL 2 TIMES DAILY
Qty: 6 TABLET | Refills: 0 | Status: SHIPPED | OUTPATIENT
Start: 2021-01-20

## 2021-01-20 RX ADMIN — TETANUS TOXOID, REDUCED DIPHTHERIA TOXOID AND ACELLULAR PERTUSSIS VACCINE, ADSORBED 0.5 ML: 5; 2.5; 8; 8; 2.5 SUSPENSION INTRAMUSCULAR at 09:17

## 2021-01-20 RX ADMIN — AMOXICILLIN AND CLAVULANATE POTASSIUM 1 TABLET: 875; 125 TABLET, FILM COATED ORAL at 10:05

## 2021-01-20 RX ADMIN — LIDOCAINE HYDROCHLORIDE 10 ML: 10 INJECTION, SOLUTION INFILTRATION; PERINEURAL at 09:42

## 2021-01-20 NOTE — ED PROVIDER NOTES
TRIAGE CHIEF COMPLAINT:     Nursing and triage notes reviewed    Chief Complaint   Patient presents with   • Laceration      HPI: Devan Jj is a 40 y.o. male who presents to the emergency department complaining of a laceration to his right hand.  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  This occurred approximately an hour prior to arrival.  Patient states his last tetanus shot was 9 or 10 years ago, he is uncertain.  He denies possibility of any retained foreign body given it was a large piece of metal.  He states he did have gloves on.  Denies any numbness or tingling in his hand only pain.    REVIEW OF SYSTEMS: All other systems reviewed and are negative     PAST MEDICAL HISTORY:   Past Medical History:   Diagnosis Date   • Bipolar 1 disorder (CMS/HCC)    • Myocardial infarction (CMS/HCC)         FAMILY HISTORY:   History reviewed. No pertinent family history.     SOCIAL HISTORY:   Social History     Socioeconomic History   • Marital status: Single     Spouse name: Not on file   • Number of children: Not on file   • Years of education: Not on file   • Highest education level: Not on file   Tobacco Use   • Smoking status: Current Every Day Smoker     Packs/day: 0.50     Types: Cigarettes   • Smokeless tobacco: Never Used   Substance and Sexual Activity   • Alcohol use: No   • Drug use: No   • Sexual activity: Defer        SURGICAL HISTORY:   Past Surgical History:   Procedure Laterality Date   • TOTAL HIP ARTHROPLASTY     • TOTAL KNEE ARTHROPLASTY          CURRENT MEDICATIONS:      Medication List      You have not been prescribed any medications.          ALLERGIES: Mushroom extract complex and Latex     PHYSICAL EXAM:   VITAL SIGNS:   Vitals:    01/20/21 0756   BP: 142/85   Pulse: 81   Resp: 18   Temp: 98.4 °F (36.9 °C)   SpO2: 99%      CONSTITUTIONAL: Awake, oriented, appears non-toxic   HENT: Atraumatic, normocephalic,  oral mucosa pink and moist, airway patent. Nares patent without drainage. External ears normal.   EYES: Conjunctiva clear  NECK: Trachea midline   CARDIOVASCULAR: Normal heart rate, Normal rhythm, No murmurs, rubs, gallops   PULMONARY/CHEST: Clear to auscultation, no rhonchi, wheezes, or rales. Symmetrical breath sounds  ABDOMINAL: Non-distended, soft, non-tender - no rebound or guarding   NEUROLOGIC: Non-focal, moving all four extremities, no gross sensory or motor deficits.   EXTREMITIES: No clubbing, cyanosis.  There is a 1 cm laceration just proximal to the MCP joint of the right thumb.  There is no current active bleeding.  The area is tender to palpation.  Patient does have full range of motion including opposition of the thumb.  Distal capillary refill is intact.  Two-point discrimination intact distally at the thumb.  SKIN: Warm, Dry, No erythema, No rash     ED COURSE / MEDICAL DECISION MAKING:   Devan Jj is a 40 y.o. male who presents to the emergency department for evaluation of a laceration to the base of the right thumb.  Patient is nondistressed on arrival in the emergency department.  Vital signs are stable on arrival.  Physical examination does reveal a 1 cm laceration at the base of the right thumb.  There is no current active bleeding.  No obvious sign of a foreign body.    X-ray per radiology interpretation does not reveal any fracture, dislocation, or sign of foreign body    Patient will be placed on antibiotics as the wound appeared initially dirty.    Laceration procedure note: Patient did verbally consent to have the procedure performed.  The area was cleansed with chlorhexidine scrub and then draped in sterile fashion.  Sterile gloves and instruments were used.  Using 1% lidocaine approximately 2 cc used for local anesthesia.  Local wound exploration did not reveal any obvious sign of a foreign body.  Using 4-0 Ethilon sutures in simple interrupted fashion the wound was closed with good  wound approximation.  Patient tolerated the procedure well.  A sterile dressing was placed after.    DECISION TO DISCHARGE/ADMIT: see ED care timeline     FINAL IMPRESSION:   1 --thumb laceration  2 --   3 --     Electronically signed by: Neetu Avila MD, 1/20/2021 08:27 Neetu Huddleston MD  01/20/21 0952

## 2021-03-24 ENCOUNTER — TRANSCRIBE ORDERS (OUTPATIENT)
Dept: ADMINISTRATIVE | Facility: HOSPITAL | Age: 41
End: 2021-03-24

## 2021-03-24 DIAGNOSIS — S61.011A LACERATION OF RIGHT THUMB WITH COMPLICATION, INITIAL ENCOUNTER: Primary | ICD-10-CM

## 2021-04-06 ENCOUNTER — HOSPITAL ENCOUNTER (OUTPATIENT)
Dept: MRI IMAGING | Facility: HOSPITAL | Age: 41
Discharge: HOME | End: 2021-04-06
Admitting: SURGERY

## 2021-04-06 DIAGNOSIS — S61.011A LACERATION OF RIGHT THUMB WITH COMPLICATION, INITIAL ENCOUNTER: ICD-10-CM

## 2021-04-06 PROCEDURE — 73218 MRI UPPER EXTREMITY W/O DYE: CPT

## 2021-04-19 ENCOUNTER — HOSPITAL ENCOUNTER (EMERGENCY)
Facility: HOSPITAL | Age: 41
Discharge: HOME | End: 2021-04-19
Attending: EMERGENCY MEDICINE | Admitting: EMERGENCY MEDICINE

## 2021-04-19 VITALS
DIASTOLIC BLOOD PRESSURE: 108 MMHG | BODY MASS INDEX: 23.7 KG/M2 | HEIGHT: 68 IN | TEMPERATURE: 98.1 F | OXYGEN SATURATION: 99 % | SYSTOLIC BLOOD PRESSURE: 133 MMHG | RESPIRATION RATE: 18 BRPM | WEIGHT: 156.4 LBS | HEART RATE: 88 BPM

## 2021-04-19 DIAGNOSIS — S61.213A LACERATION OF LEFT MIDDLE FINGER WITHOUT FOREIGN BODY WITHOUT DAMAGE TO NAIL, INITIAL ENCOUNTER: Primary | ICD-10-CM

## 2021-04-19 PROCEDURE — 99282 EMERGENCY DEPT VISIT SF MDM: CPT

## 2021-04-19 PROCEDURE — 25010000003 LIDOCAINE 1 % SOLUTION: Performed by: PHYSICIAN ASSISTANT

## 2021-04-19 RX ORDER — CEPHALEXIN 500 MG/1
500 CAPSULE ORAL 4 TIMES DAILY
Qty: 20 CAPSULE | Refills: 0 | Status: SHIPPED | OUTPATIENT
Start: 2021-04-19 | End: 2021-04-24

## 2021-04-19 RX ORDER — LIDOCAINE HYDROCHLORIDE 10 MG/ML
10 INJECTION, SOLUTION INFILTRATION; PERINEURAL ONCE
Status: COMPLETED | OUTPATIENT
Start: 2021-04-19 | End: 2021-04-19

## 2021-04-19 RX ADMIN — LIDOCAINE HYDROCHLORIDE 10 ML: 10 INJECTION, SOLUTION INFILTRATION; PERINEURAL at 14:48

## 2021-07-02 ENCOUNTER — HOSPITAL ENCOUNTER (EMERGENCY)
Facility: HOSPITAL | Age: 41
Discharge: HOME | End: 2021-07-02
Attending: EMERGENCY MEDICINE | Admitting: EMERGENCY MEDICINE

## 2021-07-02 VITALS
BODY MASS INDEX: 23.46 KG/M2 | DIASTOLIC BLOOD PRESSURE: 80 MMHG | RESPIRATION RATE: 16 BRPM | SYSTOLIC BLOOD PRESSURE: 118 MMHG | HEIGHT: 68 IN | WEIGHT: 154.8 LBS | TEMPERATURE: 98 F | OXYGEN SATURATION: 97 % | HEART RATE: 84 BPM

## 2021-07-02 DIAGNOSIS — T23.101A SUPERFICIAL BURN OF RIGHT HAND, UNSPECIFIED SITE OF HAND, INITIAL ENCOUNTER: Primary | ICD-10-CM

## 2021-07-02 PROCEDURE — 99283 EMERGENCY DEPT VISIT LOW MDM: CPT

## 2021-07-02 RX ORDER — MORPHINE SULFATE 4 MG/ML
4 INJECTION, SOLUTION INTRAMUSCULAR; INTRAVENOUS ONCE
Status: DISCONTINUED | OUTPATIENT
Start: 2021-07-02 | End: 2021-07-02 | Stop reason: HOSPADM

## 2021-07-02 RX ORDER — HYDROCODONE BITARTRATE AND ACETAMINOPHEN 5; 325 MG/1; MG/1
1 TABLET ORAL EVERY 6 HOURS PRN
Qty: 10 TABLET | Refills: 0 | Status: SHIPPED | OUTPATIENT
Start: 2021-07-02

## 2021-07-02 RX ORDER — IBUPROFEN 600 MG/1
600 TABLET ORAL EVERY 6 HOURS PRN
Qty: 30 TABLET | Refills: 0 | Status: SHIPPED | OUTPATIENT
Start: 2021-07-02

## 2021-07-02 RX ORDER — IBUPROFEN 800 MG/1
800 TABLET ORAL ONCE
Status: COMPLETED | OUTPATIENT
Start: 2021-07-02 | End: 2021-07-02

## 2021-07-02 RX ORDER — HYDROCODONE BITARTRATE AND ACETAMINOPHEN 5; 325 MG/1; MG/1
1 TABLET ORAL ONCE
Status: COMPLETED | OUTPATIENT
Start: 2021-07-02 | End: 2021-07-02

## 2021-07-02 RX ADMIN — SILVER SULFADIAZINE 1 APPLICATION: 10 CREAM TOPICAL at 14:23

## 2021-07-02 RX ADMIN — HYDROCODONE BITARTRATE AND ACETAMINOPHEN 1 TABLET: 5; 325 TABLET ORAL at 15:06

## 2021-07-02 RX ADMIN — IBUPROFEN 800 MG: 800 TABLET, FILM COATED ORAL at 14:43

## 2021-07-02 NOTE — ED PROVIDER NOTES
Subjective   41-year-old male presents to the ED with a chief complaint of burn.  The patient sustained a grease burn to his right hand just prior to arrival.  Patient states that he works at [employer] and accidentally stuck his right hand and forearm in hot grease.  He has severe pain in his right hand and forearm.  Rates it a 10 out of 10.  Pain does not radiate.  No other injuries.  No prior treatments or limiting factors.  No other complaints at this time.          Review of Systems   Skin: Positive for wound.   All other systems reviewed and are negative.      Past Medical History:   Diagnosis Date   • Bipolar 1 disorder (CMS/HCC)    • Kidney stone    • Myocardial infarction (CMS/HCC)        Allergies   Allergen Reactions   • Mushroom Extract Complex Anaphylaxis   • Latex Rash       Past Surgical History:   Procedure Laterality Date   • TOTAL HIP ARTHROPLASTY     • TOTAL KNEE ARTHROPLASTY         History reviewed. No pertinent family history.    Social History     Socioeconomic History   • Marital status: Single     Spouse name: Not on file   • Number of children: Not on file   • Years of education: Not on file   • Highest education level: Not on file   Tobacco Use   • Smoking status: Current Every Day Smoker     Packs/day: 0.50     Types: Cigarettes   • Smokeless tobacco: Never Used   Vaping Use   • Vaping Use: Never used   Substance and Sexual Activity   • Alcohol use: No   • Drug use: No   • Sexual activity: Defer           Objective   Physical Exam  Vitals and nursing note reviewed.   Constitutional:       General: He is not in acute distress.     Appearance: He is well-developed. He is not diaphoretic.   HENT:      Head: Normocephalic and atraumatic.      Nose: Nose normal.   Eyes:      Conjunctiva/sclera: Conjunctivae normal.      Pupils: Pupils are equal, round, and reactive to light.   Cardiovascular:      Rate and Rhythm: Normal rate and regular rhythm.   Pulmonary:      Effort: Pulmonary effort is  normal. No respiratory distress.      Breath sounds: Normal breath sounds.   Abdominal:      General: There is no distension.      Palpations: Abdomen is soft.      Tenderness: There is no abdominal tenderness.   Musculoskeletal:         General: No deformity.   Skin:     Comments: Superficial burn to right forearm and hand.  No blistering.  Worse on the flexor surface of the forearm.  Does extend over the flexor surface of the wrist but is not circumferential and appears to only be 12 cm x 6 cm on the forearm and minimal extension into the palmar surface of the hand   Neurological:      Mental Status: He is alert and oriented to person, place, and time.      Cranial Nerves: No cranial nerve deficit.      Coordination: Coordination normal.         Procedures           ED Course                                           MDM  Patient presents to the ED with a grease burn.  Is superficial blistering burns to the right forearm and hand.  Pain management given.  Discharge follow-up with burn and wound clinic at [facility].  He is agreeable to this plan.      Final diagnoses:   Superficial burn of right hand, unspecified site of hand, initial encounter       ED Disposition  ED Disposition     ED Disposition Condition Comment    Discharge Stable           [address]   [place] [address] [phone]  Call       [facility]  [address] [place] [address] [phone]  Schedule an appointment as soon as possible for a visit            Medication List      New Prescriptions    HYDROcodone-acetaminophen 5-325 MG per tablet  Commonly known as: NORCO  Take 1 tablet by mouth Every 6 (Six) Hours As Needed for Severe Pain .     ibuprofen 600 MG tablet  Commonly known as: ADVIL,MOTRIN  Take 1 tablet by mouth Every 6 (Six) Hours As Needed for Mild Pain .           Where to Get Your Medications      These medications were sent to  [facility] [address] [place] [address], [place] - [address] [facility] - [phone]  - [phone] FX  [address] [facility], [facility] [address]    Phone: [phone]   · HYDROcodone-acetaminophen 5-325 MG per tablet  · ibuprofen 600 MG tablet          Malick Ahmadi, DO  07/02/21 1526

## 2021-07-05 ENCOUNTER — HOSPITAL ENCOUNTER (EMERGENCY)
Facility: HOSPITAL | Age: 41
Discharge: HOME | End: 2021-07-05
Attending: EMERGENCY MEDICINE | Admitting: EMERGENCY MEDICINE

## 2021-07-05 VITALS
WEIGHT: 154.2 LBS | TEMPERATURE: 98 F | OXYGEN SATURATION: 98 % | BODY MASS INDEX: 23.37 KG/M2 | HEART RATE: 70 BPM | RESPIRATION RATE: 16 BRPM | SYSTOLIC BLOOD PRESSURE: 138 MMHG | HEIGHT: 68 IN | DIASTOLIC BLOOD PRESSURE: 89 MMHG

## 2021-07-05 DIAGNOSIS — T23.271A PARTIAL THICKNESS BURN OF RIGHT WRIST, INITIAL ENCOUNTER: Primary | ICD-10-CM

## 2021-07-05 PROCEDURE — 99282 EMERGENCY DEPT VISIT SF MDM: CPT

## 2021-07-05 RX ORDER — IBUPROFEN 800 MG/1
800 TABLET ORAL EVERY 6 HOURS PRN
Qty: 60 TABLET | Refills: 0 | Status: SHIPPED | OUTPATIENT
Start: 2021-07-05

## 2021-07-05 NOTE — ED PROVIDER NOTES
"Subjective   41-year-old male presents to the ED with a chief complaint of \"Burn\" X several days.  The patient sustained a grease burn to his right hand several days ago by Patient states that he works at [employer] and accidentally stuck his right hand and forearm in hot grease.  He has severe pain in his right hand and forearm.  Rates it a 10 out of 10.  Pain does not radiate.  No other injuries.  No prior treatments or limiting factors.  No other complaints at this time, patient states he has been unable to return to work secondary to pain.      History provided by:  Patient   used: No    Burn  Burn location:  Shoulder/arm  Shoulder/arm burn location:  R wrist  Burn quality:  Red and ruptured blister  Time since incident:  3 days  Progression:  Worsening  Mechanism of burn:  Hot liquid  Incident location:  Another residence  Relieved by:  Nothing  Worsened by:  Nothing  Ineffective treatments:  None tried  Associated symptoms: no cough, no difficulty swallowing, no eye pain, no nasal burns and no shortness of breath    Tetanus status:  Up to date      Review of Systems   Constitutional: Negative.  Negative for activity change, appetite change, chills, diaphoresis and fatigue.   HENT: Negative for trouble swallowing.    Eyes: Negative.  Negative for pain, discharge and itching.   Respiratory: Negative.  Negative for apnea, cough, chest tightness and shortness of breath.    Cardiovascular: Negative.    Gastrointestinal: Negative.  Negative for abdominal distention, abdominal pain, anal bleeding, constipation and diarrhea.   Endocrine: Negative.  Negative for cold intolerance, heat intolerance, polydipsia and polyphagia.   Genitourinary: Negative.  Negative for difficulty urinating, dysuria, enuresis, flank pain, frequency, genital sores and hematuria.   Musculoskeletal: Negative.  Negative for back pain, gait problem, joint swelling and myalgias.   Skin: Positive for rash and wound. Negative for " color change.   Neurological: Negative.  Negative for dizziness, seizures, light-headedness, numbness and headaches.   Hematological: Negative.    Psychiatric/Behavioral: Negative.  Negative for behavioral problems, confusion, decreased concentration and dysphoric mood.   All other systems reviewed and are negative.      Past Medical History:   Diagnosis Date   • Bipolar 1 disorder (CMS/HCC)    • Kidney stone    • Myocardial infarction (CMS/HCC)        Allergies   Allergen Reactions   • Mushroom Extract Complex Anaphylaxis   • Latex Rash       Past Surgical History:   Procedure Laterality Date   • TOTAL HIP ARTHROPLASTY     • TOTAL KNEE ARTHROPLASTY         History reviewed. No pertinent family history.    Social History     Socioeconomic History   • Marital status: Single     Spouse name: Not on file   • Number of children: Not on file   • Years of education: Not on file   • Highest education level: Not on file   Tobacco Use   • Smoking status: Current Every Day Smoker     Packs/day: 0.50     Types: Cigarettes   • Smokeless tobacco: Never Used   Vaping Use   • Vaping Use: Never used   Substance and Sexual Activity   • Alcohol use: No   • Drug use: No   • Sexual activity: Defer           Objective   Physical Exam  Vitals and nursing note reviewed.   Constitutional:       General: He is not in acute distress.     Appearance: Normal appearance. He is normal weight. He is not ill-appearing, toxic-appearing or diaphoretic.   HENT:      Head: Normocephalic and atraumatic.      Right Ear: Tympanic membrane, ear canal and external ear normal. There is no impacted cerumen.      Left Ear: Tympanic membrane, ear canal and external ear normal. There is no impacted cerumen.      Nose: Nose normal. No congestion or rhinorrhea.      Mouth/Throat:      Mouth: Mucous membranes are moist.      Pharynx: Oropharynx is clear. No oropharyngeal exudate or posterior oropharyngeal erythema.   Eyes:      General: No scleral icterus.         Right eye: No discharge.         Left eye: No discharge.      Extraocular Movements: Extraocular movements intact.      Conjunctiva/sclera: Conjunctivae normal.      Pupils: Pupils are equal, round, and reactive to light.   Neck:      Vascular: No carotid bruit.   Cardiovascular:      Rate and Rhythm: Normal rate and regular rhythm.      Pulses: Normal pulses.      Heart sounds: Normal heart sounds. No murmur heard.   No friction rub. No gallop.    Pulmonary:      Effort: Pulmonary effort is normal. No respiratory distress.      Breath sounds: Normal breath sounds. No stridor. No wheezing, rhonchi or rales.   Chest:      Chest wall: No tenderness.   Abdominal:      General: Abdomen is flat. Bowel sounds are normal. There is no distension.      Palpations: Abdomen is soft. There is no mass.      Tenderness: There is no abdominal tenderness. There is no right CVA tenderness, left CVA tenderness, guarding or rebound.      Hernia: No hernia is present.   Musculoskeletal:         General: Swelling and tenderness present. No deformity or signs of injury. Normal range of motion.      Cervical back: Normal range of motion. No rigidity or tenderness.      Right lower leg: No edema.   Lymphadenopathy:      Cervical: No cervical adenopathy.   Skin:     General: Skin is warm and dry.      Capillary Refill: Capillary refill takes less than 2 seconds.      Coloration: Skin is not jaundiced.      Findings: Erythema present. No bruising or lesion.      Comments:  Superficial burn to right forearm and hand.  No blistering.  Worse on the flexor surface of the forearm.  Does extend over the flexor surface of the wrist but is not circumferential and appears to only be 12 cm x 6 cm on the forearm and minimal extension into the palmar surface of the hand    Neurological:      General: No focal deficit present.      Mental Status: He is alert. Mental status is at baseline.      Cranial Nerves: No cranial nerve deficit.      Sensory: No  sensory deficit.      Motor: No weakness.      Coordination: Coordination normal.      Gait: Gait normal.   Psychiatric:         Mood and Affect: Mood normal.         Behavior: Behavior normal.         Thought Content: Thought content normal.         Judgment: Judgment normal.         Procedures           ED Course                                           MDM    Final diagnoses:   Partial thickness burn of right wrist, initial encounter       ED Disposition  ED Disposition     ED Disposition Condition Comment    Discharge Stable           [facility]  [address] [facility] [address]  [phone]  Call in 1 day           Medication List      Changed    * ibuprofen 600 MG tablet  Commonly known as: ADVIL,MOTRIN  Take 1 tablet by mouth Every 6 (Six) Hours As Needed for Mild Pain .  What changed: Another medication with the same name was added. Make sure you understand how and when to take each.     * ibuprofen 800 MG tablet  Commonly known as: ADVIL,MOTRIN  Take 1 tablet by mouth Every 6 (Six) Hours As Needed for Mild Pain .  What changed: You were already taking a medication with the same name, and this prescription was added. Make sure you understand how and when to take each.         * This list has 2 medication(s) that are the same as other medications prescribed for you. Read the directions carefully, and ask your doctor or other care provider to review them with you.               Where to Get Your Medications      These medications were sent to [facility] [address] [place] [address] - [address] [facility] - [phone] [facility] [phone]   [address] [facility] [address]    Phone: [phone]   · ibuprofen 800 MG tablet          Laith Alcala PA-C  07/05/21 1927

## 2021-11-29 NOTE — ED PROVIDER NOTES
Reason for Disposition  • Chest pain  • [1] Chest pain (or \"angina\") comes and goes AND [2] is happening more often (increasing in frequency) or getting worse (increasing in severity) (Exception: chest pains that last only a few seconds)    Additional Information  • Negative: Shock suspected (e.g., cold/pale/clammy skin, too weak to stand, low BP, rapid pulse)     Unable to check BP since it not home.  Report pulse of 68 @ 1033 while on the phone  • Negative: Heart beating < 50 beats per minute OR > 140 beats per minute     Checked pulse while on the pone @ 1033 and it was 68    Protocols used: ABDOMINAL PAIN - FEMALE-A-AH, CHEST PAIN-A-AH     Subjective   Patient is a 40-year-old male with history of bipolar disorder, kidney stones and MI presenting to the ER for evaluation of laceration.  He states that just prior to arrival he was lifting a box of tiles and believes he cut his finger on a piece of broken tile.  It is at the distal end of his left middle finger.  Laceration is small in nature, no nailbed involvement. He complains of pain but denies any significant decreased range of motion.  He states his last tetanus shot has been within the past year.          Review of Systems   Constitutional: Negative for chills and fever.   HENT: Negative.    Eyes: Negative.    Respiratory: Negative.    Cardiovascular: Negative.    Gastrointestinal: Negative.    Genitourinary: Negative.    Musculoskeletal: Negative.    Skin: Positive for wound.   Allergic/Immunologic: Negative for immunocompromised state.   Neurological: Negative.    Psychiatric/Behavioral: Negative.        Past Medical History:   Diagnosis Date   • Bipolar 1 disorder (CMS/HCC)    • Kidney stone    • Myocardial infarction (CMS/HCC)        Allergies   Allergen Reactions   • Mushroom Extract Complex Anaphylaxis   • Latex Rash       Past Surgical History:   Procedure Laterality Date   • TOTAL HIP ARTHROPLASTY     • TOTAL KNEE ARTHROPLASTY         History reviewed. No pertinent family history.    Social History     Socioeconomic History   • Marital status: Single     Spouse name: Not on file   • Number of children: Not on file   • Years of education: Not on file   • Highest education level: Not on file   Tobacco Use   • Smoking status: Current Every Day Smoker     Packs/day: 0.50     Types: Cigarettes   • Smokeless tobacco: Never Used   Vaping Use   • Vaping Use: Never used   Substance and Sexual Activity   • Alcohol use: No   • Drug use: No   • Sexual activity: Defer           Objective   Physical Exam  Vitals and nursing note reviewed.     BP (!) 133/108 (BP Location: Right arm, Patient Position:  "Sitting)   Pulse 88   Temp 98.1 °F (36.7 °C) (Oral)   Resp 18   Ht 172.7 cm (68\")   Wt 70.9 kg (156 lb 6.4 oz)   SpO2 99%   BMI 23.78 kg/m²     GEN: No acute distress, sitting from stretcher.  Awake alert.  Does not appear toxic.    Head: Normocephalic, atraumatic  Skin: Has a laceration is approximately 0.3 cm on the palmar aspect of his distal middle finger finger.  No active bleeding, no obvious foreign body.  Eyes: EOM intact  ENT: Mask in place per protocol  Cardiovascular: Regular rate  Lungs: Clear to auscultation bilaterally without adventitious sounds  Extremities: Laceration to extremity no obvious active bleeding, strength 5 out of 5.  Radial pulse intact, capillary refill less than 2 seconds.  Neuro: GCS 15  Psych: Mood and affect are appropriate    Laceration Repair    Date/Time: 4/19/2021 2:53 PM  Performed by: Alexandra Smith PA-C  Authorized by: Malick Ahmadi DO     Consent:     Consent obtained:  Verbal    Consent given by:  Patient    Risks discussed:  Infection, pain, poor cosmetic result, poor wound healing, need for additional repair and retained foreign body  Anesthesia (see MAR for exact dosages):     Anesthesia method:  Local infiltration    Local anesthetic:  Lidocaine 1% w/o epi  Laceration details:     Location:  Finger    Finger location:  L long finger    Length (cm):  0.3    Depth (mm):  1  Repair type:     Repair type:  Simple  Pre-procedure details:     Preparation:  Patient was prepped and draped in usual sterile fashion  Exploration:     Hemostasis achieved with:  Direct pressure    Wound exploration: wound explored through full range of motion      Wound extent: no foreign bodies/material noted      Contaminated: no    Treatment:     Area cleansed with:  Saline and Hibiclens    Amount of cleaning:  Standard    Irrigation solution:  Sterile saline    Irrigation volume:  20 cc    Irrigation method:  Pressure wash    Visualized foreign bodies/material removed: no    Skin " repair:     Repair method:  Sutures    Suture size:  5-0    Suture material:  Nylon    Suture technique:  Simple interrupted    Number of sutures:  2  Approximation:     Approximation:  Close  Post-procedure details:     Dressing:  Bulky dressing and splint for protection    Patient tolerance of procedure:  Tolerated well, no immediate complications               ED Course                                           MDM  Number of Diagnoses or Management Options  Laceration of left middle finger without foreign body without damage to nail, initial encounter  Diagnosis management comments: On arrival, patient is stable.  Patient has a small laceration to the distal finger.  Tetanus is up-to-date.  I have low suspicion for any kind of underlying tendon or ligament injury, no obvious foreign body.  Discussed treatment options with the patient.  He prefers that we try to give him stitches.    Wound was thoroughly cleaned, irrigated and repaired per procedure note.  Will place on a short dose of antibiotics.  Discussed follow-up and strict return precautions, wound care.  Patient verbalized understanding and was in agreement with this plan of care.       Amount and/or Complexity of Data Reviewed  Review and summarize past medical records: yes    Risk of Complications, Morbidity, and/or Mortality  Presenting problems: low  Diagnostic procedures: low  Management options: low    Patient Progress  Patient progress: improved      Final diagnoses:   Laceration of left middle finger without foreign body without damage to nail, initial encounter       ED Disposition  ED Disposition     ED Disposition Condition Comment    Discharge Stable           PATIENT [facility] [address] [phone]  Schedule an appointment as soon as possible for a visit            Medication List      New Prescriptions    cephalexin 500 MG capsule  Commonly known as: KEFLEX  Take 1 capsule by mouth 4 (Four) Times a Day for 5  days.           Where to Get Your Medications      These medications were sent to [id number] - [place], [place] - [address] - [phone]  - [phone] FX  [address], [place] [address]    Phone: [phone]   · cephalexin 500 MG capsule          Alexandra Smith PA-C  04/19/21 5518

## 2024-02-20 ENCOUNTER — APPOINTMENT (OUTPATIENT)
Dept: GENERAL RADIOLOGY | Facility: HOSPITAL | Age: 44
End: 2024-02-20
Payer: OTHER MISCELLANEOUS

## 2024-02-20 ENCOUNTER — APPOINTMENT (OUTPATIENT)
Dept: CT IMAGING | Facility: HOSPITAL | Age: 44
End: 2024-02-20
Payer: OTHER MISCELLANEOUS

## 2024-02-20 ENCOUNTER — HOSPITAL ENCOUNTER (EMERGENCY)
Facility: HOSPITAL | Age: 44
Discharge: HOME | End: 2024-02-20
Attending: EMERGENCY MEDICINE | Admitting: EMERGENCY MEDICINE
Payer: OTHER MISCELLANEOUS

## 2024-02-20 VITALS
RESPIRATION RATE: 22 BRPM | HEART RATE: 78 BPM | TEMPERATURE: 98 F | OXYGEN SATURATION: 99 % | WEIGHT: 151 LBS | SYSTOLIC BLOOD PRESSURE: 104 MMHG | HEIGHT: 68 IN | DIASTOLIC BLOOD PRESSURE: 92 MMHG | BODY MASS INDEX: 22.88 KG/M2

## 2024-02-20 DIAGNOSIS — M54.6 ACUTE MIDLINE THORACIC BACK PAIN: Primary | ICD-10-CM

## 2024-02-20 DIAGNOSIS — M54.50 LUMBAR BACK PAIN: ICD-10-CM

## 2024-02-20 DIAGNOSIS — S13.4XXA WHIPLASH INJURY TO NECK, INITIAL ENCOUNTER: ICD-10-CM

## 2024-02-20 LAB
ALBUMIN SERPL-MCNC: 4.5 G/DL (ref 3.5–5.2)
ALBUMIN/GLOB SERPL: 1.7 G/DL
ALP SERPL-CCNC: 97 U/L (ref 39–117)
ALT SERPL W P-5'-P-CCNC: 19 U/L (ref 1–41)
ANION GAP SERPL CALCULATED.3IONS-SCNC: 11.4 MMOL/L (ref 5–15)
AST SERPL-CCNC: 23 U/L (ref 1–40)
BASOPHILS # BLD AUTO: 0.06 10*3/MM3 (ref 0–0.2)
BASOPHILS NFR BLD AUTO: 0.4 % (ref 0–1.5)
BILIRUB SERPL-MCNC: 0.2 MG/DL (ref 0–1.2)
BUN SERPL-MCNC: 11 MG/DL (ref 6–20)
BUN/CREAT SERPL: 10.9 (ref 7–25)
CALCIUM SPEC-SCNC: 8.8 MG/DL (ref 8.6–10.5)
CHLORIDE SERPL-SCNC: 102 MMOL/L (ref 98–107)
CO2 SERPL-SCNC: 23.6 MMOL/L (ref 22–29)
CREAT SERPL-MCNC: 1.01 MG/DL (ref 0.76–1.27)
DEPRECATED RDW RBC AUTO: 42.7 FL (ref 37–54)
EGFRCR SERPLBLD CKD-EPI 2021: 94.6 ML/MIN/1.73
EOSINOPHIL # BLD AUTO: 0.1 10*3/MM3 (ref 0–0.4)
EOSINOPHIL NFR BLD AUTO: 0.7 % (ref 0.3–6.2)
ERYTHROCYTE [DISTWIDTH] IN BLOOD BY AUTOMATED COUNT: 12.9 % (ref 12.3–15.4)
GLOBULIN UR ELPH-MCNC: 2.7 GM/DL
GLUCOSE SERPL-MCNC: 131 MG/DL (ref 65–99)
HCT VFR BLD AUTO: 43.4 % (ref 37.5–51)
HGB BLD-MCNC: 14.7 G/DL (ref 13–17.7)
IMM GRANULOCYTES # BLD AUTO: 0.06 10*3/MM3 (ref 0–0.05)
IMM GRANULOCYTES NFR BLD AUTO: 0.4 % (ref 0–0.5)
LYMPHOCYTES # BLD AUTO: 2.13 10*3/MM3 (ref 0.7–3.1)
LYMPHOCYTES NFR BLD AUTO: 15.4 % (ref 19.6–45.3)
MCH RBC QN AUTO: 30.6 PG (ref 26.6–33)
MCHC RBC AUTO-ENTMCNC: 33.9 G/DL (ref 31.5–35.7)
MCV RBC AUTO: 90.4 FL (ref 79–97)
MONOCYTES # BLD AUTO: 0.93 10*3/MM3 (ref 0.1–0.9)
MONOCYTES NFR BLD AUTO: 6.7 % (ref 5–12)
NEUTROPHILS NFR BLD AUTO: 10.53 10*3/MM3 (ref 1.7–7)
NEUTROPHILS NFR BLD AUTO: 76.4 % (ref 42.7–76)
NRBC BLD AUTO-RTO: 0 /100 WBC (ref 0–0.2)
PLATELET # BLD AUTO: 230 10*3/MM3 (ref 140–450)
PMV BLD AUTO: 11.3 FL (ref 6–12)
POTASSIUM SERPL-SCNC: 3.5 MMOL/L (ref 3.5–5.2)
PROT SERPL-MCNC: 7.2 G/DL (ref 6–8.5)
RBC # BLD AUTO: 4.8 10*6/MM3 (ref 4.14–5.8)
SODIUM SERPL-SCNC: 137 MMOL/L (ref 136–145)
TROPONIN T SERPL HS-MCNC: <6 NG/L
WBC NRBC COR # BLD AUTO: 13.81 10*3/MM3 (ref 3.4–10.8)

## 2024-02-20 PROCEDURE — 25010000002 KETOROLAC TROMETHAMINE PER 15 MG: Performed by: NURSE PRACTITIONER

## 2024-02-20 PROCEDURE — 99284 EMERGENCY DEPT VISIT MOD MDM: CPT

## 2024-02-20 PROCEDURE — 36415 COLL VENOUS BLD VENIPUNCTURE: CPT

## 2024-02-20 PROCEDURE — 71045 X-RAY EXAM CHEST 1 VIEW: CPT

## 2024-02-20 PROCEDURE — 93005 ELECTROCARDIOGRAM TRACING: CPT | Performed by: NURSE PRACTITIONER

## 2024-02-20 PROCEDURE — 72125 CT NECK SPINE W/O DYE: CPT

## 2024-02-20 PROCEDURE — 80053 COMPREHEN METABOLIC PANEL: CPT | Performed by: NURSE PRACTITIONER

## 2024-02-20 PROCEDURE — 84484 ASSAY OF TROPONIN QUANT: CPT | Performed by: NURSE PRACTITIONER

## 2024-02-20 PROCEDURE — 70450 CT HEAD/BRAIN W/O DYE: CPT

## 2024-02-20 PROCEDURE — 72131 CT LUMBAR SPINE W/O DYE: CPT

## 2024-02-20 PROCEDURE — 72170 X-RAY EXAM OF PELVIS: CPT

## 2024-02-20 PROCEDURE — 85025 COMPLETE CBC W/AUTO DIFF WBC: CPT | Performed by: NURSE PRACTITIONER

## 2024-02-20 PROCEDURE — 72128 CT CHEST SPINE W/O DYE: CPT

## 2024-02-20 PROCEDURE — 96372 THER/PROPH/DIAG INJ SC/IM: CPT

## 2024-02-20 RX ORDER — KETOROLAC TROMETHAMINE 30 MG/ML
30 INJECTION, SOLUTION INTRAMUSCULAR; INTRAVENOUS ONCE
Status: DISCONTINUED | OUTPATIENT
Start: 2024-02-20 | End: 2024-02-20

## 2024-02-20 RX ORDER — KETOROLAC TROMETHAMINE 30 MG/ML
30 INJECTION, SOLUTION INTRAMUSCULAR; INTRAVENOUS ONCE
Status: COMPLETED | OUTPATIENT
Start: 2024-02-20 | End: 2024-02-20

## 2024-02-20 RX ORDER — CYCLOBENZAPRINE HCL 5 MG
5 TABLET ORAL 3 TIMES DAILY PRN
Qty: 20 TABLET | Refills: 0 | Status: SHIPPED | OUTPATIENT
Start: 2024-02-20 | End: 2024-03-11

## 2024-02-20 RX ADMIN — KETOROLAC TROMETHAMINE 30 MG: 30 INJECTION, SOLUTION INTRAMUSCULAR; INTRAVENOUS at 21:41

## 2024-02-20 NOTE — Clinical Note
[facility] EMERGENCY DEPARTMENT  [address] [facility] [address]  Phone: [phone]    Devan Jj was seen and treated in our emergency department on 2/20/2024.  He may return to work on 02/22/2024.         Thank you for choosing [facility].    Kalen Barros MD
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
## 2025-03-13 ENCOUNTER — HOSPITAL ENCOUNTER (EMERGENCY)
Facility: HOSPITAL | Age: 45
Discharge: HOME | End: 2025-03-13
Attending: STUDENT IN AN ORGANIZED HEALTH CARE EDUCATION/TRAINING PROGRAM
Payer: MEDICAID

## 2025-03-13 VITALS
SYSTOLIC BLOOD PRESSURE: 116 MMHG | DIASTOLIC BLOOD PRESSURE: 94 MMHG | OXYGEN SATURATION: 99 % | TEMPERATURE: 97.9 F | BODY MASS INDEX: 23.04 KG/M2 | HEIGHT: 68 IN | HEART RATE: 73 BPM | WEIGHT: 152 LBS | RESPIRATION RATE: 20 BRPM

## 2025-03-13 DIAGNOSIS — R11.2 NAUSEA AND VOMITING, UNSPECIFIED VOMITING TYPE: ICD-10-CM

## 2025-03-13 DIAGNOSIS — R04.0 EPISTAXIS: Primary | ICD-10-CM

## 2025-03-13 DIAGNOSIS — B34.9 VIRAL ILLNESS: ICD-10-CM

## 2025-03-13 LAB
FLUAV RNA RESP QL NAA+PROBE: NOT DETECTED
FLUBV RNA RESP QL NAA+PROBE: NOT DETECTED
RSV RNA RESP QL NAA+PROBE: NOT DETECTED
SARS-COV-2 RNA RESP QL NAA+PROBE: NOT DETECTED

## 2025-03-13 PROCEDURE — 99283 EMERGENCY DEPT VISIT LOW MDM: CPT | Performed by: STUDENT IN AN ORGANIZED HEALTH CARE EDUCATION/TRAINING PROGRAM

## 2025-03-13 PROCEDURE — 87637 SARSCOV2&INF A&B&RSV AMP PRB: CPT | Performed by: STUDENT IN AN ORGANIZED HEALTH CARE EDUCATION/TRAINING PROGRAM

## 2025-03-13 RX ORDER — ONDANSETRON 4 MG/1
4 TABLET, ORALLY DISINTEGRATING ORAL 4 TIMES DAILY PRN
Qty: 12 TABLET | Refills: 0 | Status: SHIPPED | OUTPATIENT
Start: 2025-03-13

## 2025-03-13 RX ORDER — OXYMETAZOLINE HYDROCHLORIDE 0.05 G/100ML
1 SPRAY NASAL ONCE
Status: COMPLETED | OUTPATIENT
Start: 2025-03-13 | End: 2025-03-13

## 2025-03-13 RX ADMIN — OXYMETAZOLINE HYDROCHLORIDE 1 SPRAY: 0.5 SPRAY NASAL at 03:04

## 2025-03-13 NOTE — ED PROVIDER NOTES
[facility] EMERGENCY DEPARTMENT  Emergency Department Encounter  Emergency Medicine Physician Note       Pt Name: Devan Jj  MRN: [id number]  Pt :   1980  Room Number:  22SF/22  Date of encounter:  3/13/2025  PCP: Provider, No Known  ED Provider: Mino Mayorga MD    Historian: Patient      HPI:  Chief Complaint: Flu like symptoms        Context: Devan Jj is a 44 y.o. male who presents to the ED for flulike symptoms.  Patient reports over the past day he has had bodyaches and chills.  He also reports nasal congestion and he has had 2 nosebleeds over similar timeframe.  He reports subjective fevers.  He states he has had nausea and vomiting.  Denies abdominal pain.  No headache.      PAST MEDICAL HISTORY  Past Medical History:   Diagnosis Date    Bipolar 1 disorder     Kidney stone     Myocardial infarction          PAST SURGICAL HISTORY  Past Surgical History:   Procedure Laterality Date    TOTAL HIP ARTHROPLASTY      TOTAL KNEE ARTHROPLASTY           FAMILY HISTORY  History reviewed. No pertinent family history.      SOCIAL HISTORY  Social History     Socioeconomic History    Marital status: Single   Tobacco Use    Smoking status: Every Day     Current packs/day: 0.50     Types: Cigarettes    Smokeless tobacco: Never   Vaping Use    Vaping status: Never Used   Substance and Sexual Activity    Alcohol use: No    Drug use: No    Sexual activity: Defer         ALLERGIES  Mushroom extract complex (obsolete) and Latex        REVIEW OF SYSTEMS  As noted in HPI      PHYSICAL EXAM    I have reviewed the triage vital signs and nursing notes.    ED Triage Vitals [25 0158]   Temp Heart Rate Resp BP SpO2   97.9 °F (36.6 °C) 73 20 116/94 99 %      Temp src Heart Rate Source Patient Position BP Location FiO2 (%)   Oral Monitor Sitting Left arm --       Physical Exam  Constitutional:       General: He is not in acute distress.  HENT:      Head: Atraumatic.      Right Ear: External  ear normal.      Left Ear: External ear normal.      Nose: Congestion present.      Mouth/Throat:      Mouth: Mucous membranes are moist.      Pharynx: No oropharyngeal exudate or posterior oropharyngeal erythema.   Eyes:      Extraocular Movements: Extraocular movements intact.      Pupils: Pupils are equal, round, and reactive to light.   Cardiovascular:      Rate and Rhythm: Normal rate and regular rhythm.      Pulses: Normal pulses.   Pulmonary:      Effort: Pulmonary effort is normal. No respiratory distress.      Breath sounds: No wheezing or rales.   Abdominal:      General: There is no distension.      Palpations: Abdomen is soft.      Tenderness: There is no abdominal tenderness.   Musculoskeletal:      Cervical back: Neck supple.   Skin:     General: Skin is warm.   Neurological:      General: No focal deficit present.      Mental Status: He is alert.         LAB RESULTS  Recent Results (from the past 24 hours)   COVID-19, FLU A/B, RSV PCR 1 HR TAT - Swab, Nasopharynx    Collection Time: 03/13/25  2:15 AM    Specimen: Nasopharynx; Swab   Result Value Ref Range    COVID19 Not Detected Not Detected - Ref. Range    Influenza A PCR Not Detected Not Detected    Influenza B PCR Not Detected Not Detected    RSV, PCR Not Detected Not Detected       If labs were ordered, I independently reviewed the results and considered them in treating the patient.        RADIOLOGY  No Radiology Exams Resulted Within Past 24 Hours    PROCEDURES    Procedures    No orders to display       MEDICATIONS GIVEN IN ER    Medications   oxymetazoline (AFRIN) nasal spray 1 spray (1 spray Each Nare Given 3/13/25 0304)         MEDICAL DECISION MAKING, PROGRESS, and CONSULTS    All labs, if obtained, have been independently reviewed by me.  All radiology studies, if obtained, have been reviewed by me and the radiologist dictating the report.  All EKG's, if obtained, have been independently viewed and interpreted by me.      Discussion below  represents my analysis of pertinent findings related to patient's condition, differential diagnosis, treatment plan and final disposition.                         Differential diagnosis:    Viral illness, epistaxis, allergic rhinitis, bronchitis, gastritis, others.      Additional sources:    - Discussed/ obtained information from independent historians:      - External (non-ED) record review: Consult note behavioral health 8/28/2017    - Chronic or social conditions impacting care:      - Shared decision making:        Orders placed during this visit:  Orders Placed This Encounter   Procedures    COVID-19, FLU A/B, RSV PCR 1 HR TAT - Swab, Nasopharynx         Additional orders considered but not ordered:      ED Course/MDM Discussion:    Patient is a 44-year-old male who presented for flulike symptoms and epistaxis starting today.    Patient is nontoxic-appearing with reassuring vital signs.  Abdomen nontender.    He has mild nasal congestion likely the impetus for his epistaxis.  He was administered Afrin to good effect no bleeding events here in the emergency department.    Symptoms most consistent with viral illness.  No cough or shortness of breath to suggest pneumonia.  Abdomen benign no abdominal tenderness to suggest surgical abdominal disease.  No urinary complaints or symptoms to suggest UTI.  Swab was obtained and negative for COVID and for flu.  He has been tolerating liquids do not feel labs are indicated at this time.    Discussed with patient typically these illnesses run its course in a few days.  Offer symptomatic management in the interim with Zofran as well as Afrin as needed for nasal congestion and epistaxis.  Counseled on outpatient follow-up and return precautions for which he demonstrated understanding.  Patient in agreement with plan.  Stable for discharge.                    Consultants:      Shared Decision Making:  After my consideration of clinical presentation and any  laboratory/radiology studies obtained, I discussed the findings with the patient/patient representative who is in agreement with the treatment plan and the final disposition.   Risks and benefits of discharge and/or observation/admission were discussed.       AS OF 04:38 EDT VITALS:    BP - 116/94  HR - 73  TEMP - 97.9 °F (36.6 °C) (Oral)  O2 SATS - 99%                  DIAGNOSIS  Final diagnoses:   Epistaxis   Nausea and vomiting, unspecified vomiting type   Viral illness         DISPOSITION  ED Disposition       ED Disposition   Discharge    Condition   Stable    Comment   --                   Please note that portions of this document were completed with voice recognition software.        Mino Mayorga MD  03/13/25 0431

## 2025-03-13 NOTE — Clinical Note
[facility] EMERGENCY DEPARTMENT  [address] [address]  Phone: [phone]    Devan Jj was seen and treated in our emergency department on 3/13/2025.  He may return to work on 03/16/2025.         Thank you for choosing [facility].    Mino Mayorga MD

## 2025-03-13 NOTE — DISCHARGE INSTRUCTIONS
You are suspected to have a viral illness that will pass on its own. Covid and flu swab was negative.  Use Afrin nasal spray as needed for nasal congestion and nosebleeding.  Do not use consecutively for more than 3 days.  Take Zofran as needed for nausea and vomiting.  Stay hydrated by drinking plenty of fluids.  Follow-up with primary care provider as needed if symptoms do not improve otherwise do not hesitate to return here to the emergency department.

## 2025-07-30 ENCOUNTER — HOSPITAL ENCOUNTER (EMERGENCY)
Facility: HOSPITAL | Age: 45
Discharge: ANOTHER HOSPITAL | End: 2025-07-30
Attending: EMERGENCY MEDICINE | Admitting: EMERGENCY MEDICINE
Payer: MEDICAID

## 2025-07-30 ENCOUNTER — APPOINTMENT (OUTPATIENT)
Dept: CT IMAGING | Facility: HOSPITAL | Age: 45
End: 2025-07-30
Payer: MEDICAID

## 2025-07-30 VITALS
WEIGHT: 152 LBS | RESPIRATION RATE: 16 BRPM | HEART RATE: 64 BPM | SYSTOLIC BLOOD PRESSURE: 135 MMHG | DIASTOLIC BLOOD PRESSURE: 94 MMHG | TEMPERATURE: 97.4 F | OXYGEN SATURATION: 99 % | BODY MASS INDEX: 23.04 KG/M2 | HEIGHT: 68 IN

## 2025-07-30 DIAGNOSIS — H53.131 ACUTE LOSS OF VISION, RIGHT: Primary | ICD-10-CM

## 2025-07-30 DIAGNOSIS — S05.31XA CORNEAL LACERATION OF RIGHT EYE, INITIAL ENCOUNTER: ICD-10-CM

## 2025-07-30 PROCEDURE — 99285 EMERGENCY DEPT VISIT HI MDM: CPT | Performed by: EMERGENCY MEDICINE

## 2025-07-30 PROCEDURE — 70480 CT ORBIT/EAR/FOSSA W/O DYE: CPT

## 2025-07-30 RX ORDER — FLUORESCEIN SODIUM 1 MG/MG
1 STRIP OPHTHALMIC ONCE
Status: COMPLETED | OUTPATIENT
Start: 2025-07-30 | End: 2025-07-30

## 2025-07-30 RX ORDER — ERYTHROMYCIN 5 MG/G
1 OINTMENT OPHTHALMIC ONCE
Status: COMPLETED | OUTPATIENT
Start: 2025-07-30 | End: 2025-07-30

## 2025-07-30 RX ORDER — ERYTHROMYCIN 5 MG/G
OINTMENT OPHTHALMIC EVERY 6 HOURS
Qty: 3.5 G | Refills: 0 | Status: SHIPPED | OUTPATIENT
Start: 2025-07-30 | End: 2025-08-06

## 2025-07-30 RX ORDER — TETRACAINE HYDROCHLORIDE 5 MG/ML
2 SOLUTION OPHTHALMIC ONCE
Status: COMPLETED | OUTPATIENT
Start: 2025-07-30 | End: 2025-07-30

## 2025-07-30 RX ADMIN — TETRACAINE HYDROCHLORIDE 2 DROP: 5 SOLUTION OPHTHALMIC at 07:20

## 2025-07-30 RX ADMIN — ERYTHROMYCIN 1 APPLICATION: 5 OINTMENT OPHTHALMIC at 09:23

## 2025-07-30 RX ADMIN — FLUORESCEIN SODIUM 1 STRIP: 1 STRIP OPHTHALMIC at 07:20

## 2025-07-30 NOTE — ED PROVIDER NOTES
[facility] EMERGENCY DEPARTMENT  Emergency Department Encounter  Emergency Medicine Physician Note     Pt Name:Devan Jj  MRN: [id number]  Birthdate 1980  Date of evaluation: 7/30/2025  PCP:  Provider, No Known  Note Started: 7:06 AM EDT      CHIEF COMPLAINT       Chief Complaint   Patient presents with    Eye Problem       HISTORY OF PRESENT ILLNESS  (Location/Symptom, Timing/Onset, Context/Setting, Quality, Duration, Modifying Factors, Severity.)      Devan Jj is a 45 y.o. male who presents with vision loss in the right eye.  Patient states that he was weed whacking on Monday when an object hit his glasses he was wearing which shattered them and hit him in the eye.  Patient's eye was noted to be red at that day.  Patient's been putting Visine in the eye over the last 2 days with improvement of the redness, but describes worsening vision loss.  Patient states that he woke up this morning with inability to see it all out of his right eye that is why he came to the emergency department.  Patient denies any rash around the eye, patient does describe some pain with movement.  Patient is able to see close up with the eye but unable to see distances.    PAST MEDICAL / SURGICAL / SOCIAL / FAMILY HISTORY     Past Medical History:   Diagnosis Date    Bipolar 1 disorder     Kidney stone     Myocardial infarction      No additional pertinent       Past Surgical History:   Procedure Laterality Date    TOTAL HIP ARTHROPLASTY      TOTAL KNEE ARTHROPLASTY       No additional pertinent       Social History     Socioeconomic History    Marital status: Single   Tobacco Use    Smoking status: Every Day     Current packs/day: 0.50     Types: Cigarettes    Smokeless tobacco: Never   Vaping Use    Vaping status: Never Used   Substance and Sexual Activity    Alcohol use: No    Drug use: No    Sexual activity: Defer       History reviewed. No pertinent family history.    Allergies:  Mushroom extract  "complex (obsolete) and Latex    Home Medications:  Prior to Admission medications    Medication Sig Start Date End Date Taking? Authorizing Provider   amoxicillin-clavulanate (AUGMENTIN) 875-125 MG per tablet Take 1 tablet by mouth 2 (Two) Times a Day. 1/20/21   Neetu Avila MD   HYDROcodone-acetaminophen (NORCO) 5-325 MG per tablet Take 1 tablet by mouth Every 6 (Six) Hours As Needed for Severe Pain . 7/2/21   Malick Ahmadi, DO   ibuprofen (ADVIL,MOTRIN) 600 MG tablet Take 1 tablet by mouth Every 6 (Six) Hours As Needed for Mild Pain . 7/2/21   Malick Ahmadi B, DO   ibuprofen (ADVIL,MOTRIN) 800 MG tablet Take 1 tablet by mouth Every 6 (Six) Hours As Needed for Mild Pain . 7/5/21   Laith Alcala PA-C   ondansetron ODT (ZOFRAN-ODT) 4 MG disintegrating tablet Take 1 tablet by mouth 4 (Four) Times a Day As Needed for Nausea or Vomiting. 3/13/25   Mino Mayorga MD         REVIEW OF SYSTEMS       Review of Systems   Constitutional:  Negative for fever.   Eyes:  Positive for photophobia, pain, redness and visual disturbance.       PHYSICAL EXAM      INITIAL VITALS:   /94   Pulse 64   Temp 97.4 °F (36.3 °C) (Oral)   Resp 16   Ht 172.7 cm (68\")   Wt 68.9 kg (152 lb)   SpO2 99%   BMI 23.11 kg/m²     Physical Exam  Constitutional:       Appearance: Normal appearance.   Eyes:      General: Lids are everted, no foreign bodies appreciated. No visual field deficit.     Intraocular pressure: Right eye pressure is 9 mmHg.      Extraocular Movements: Extraocular movements intact.      Right eye: Normal extraocular motion.      Left eye: Normal extraocular motion.      Conjunctiva/sclera:      Right eye: Right conjunctiva is not injected.      Left eye: Left conjunctiva is not injected.      Pupils: Pupils are equal, round, and reactive to light.     Neurological:      Mental Status: He is alert.           DDX/DIAGNOSTIC RESULTS / EMERGENCY DEPARTMENT COURSE / MDM     Differential Diagnosis included " but not limited: Corneal abrasion, corneal laceration, retained foreign body in the globe, traumatic iritis    Diagnoses Considered but Do Not Suspect: No concerns for globe rupture with the patient's having no Sidel sign.    Decision Rules/Scores utilized: N/A     Tests considered but not ordered and why:  N/A     MIPS: N/A     Code Status Discussion:  Not Discussed    Additional Patient Education Provided: None     Medical Decision Making    Medical Decision Making  Patient presenting with vision loss in the right eye after having a foreign body impact the eye, concerns for corneal abrasion versus laceration versus traumatic iritis, physical exam demonstrated small laceration on the inferior part of the eye, patient continued having severe pain and described foreign body sensation behind the eye on the medial canthus, CT imaging was obtained and demonstrated concerns for free air versus foreign body in this area, eyelid was inverted on the inferior and superior aspect, no foreign body noted, could not find a foreign body within the medial canthus.  Due to concerns for laceration and severe vision changes with concerns for possible traumatic iritis patient was transferred to  for emergent evaluation by ophthalmology.  Patient agreeable with transfer and further evaluation workup and care.    Problems Addressed:  Acute loss of vision, right: complicated acute illness or injury  Corneal laceration of right eye, initial encounter: complicated acute illness or injury    Amount and/or Complexity of Data Reviewed  External Data Reviewed: notes.  Radiology: ordered.  Discussion of management or test interpretation with external provider(s):  transfer physician.    Risk  Prescription drug management.        See ED COURSE for additional MDM statements    EKG  None Performed     All EKG's are interpreted by the Emergency Department Physician who either signs or Co-signs this chart in the absence of a  cardiologist.    Additional Scores                   EMERGENCY DEPARTMENT COURSE:    ED Course as of 07/30/25 1611   Wed Jul 30, 2025   0707 R: 20/200       L: 20/13      both: 20/13 [CR]   0750 Fluorescein stain in the idem and rates a small laceration on the 4:00 spot of the cornea, negative Sidel sign, normal ocular pressure of 9  [CR]   1033 Dr. Lang , accepting physician for transfer.  Patient will go by POV to [facility] emergency [CR]      ED Course User Index  [CR] Kalen Machado, DO       PROCEDURES:  None Performed   Procedures    DATA FOR LAB AND RADIOLOGY TESTS ORDERED BELOW ARE REVIEWED BY THE ED CLINICIAN:    RADIOLOGY: All x-rays, CT, MRI, and formal ultrasound images (except ED bedside ultrasound) are read by the radiologist, see reports below, unless otherwise noted in MDM or here.  Reports below are reviewed by myself.  CT Orbits Without Contrast   Final Result   1. No metallic or soft tissue foreign body in the region of the right   orbit.   2. Two punctate air densities located along the medial and superior   right globe. This may simply represent trapped air, although a foreign   body such as a 1 mm wood fragment could have a similar appearance.   3. No acute fracture with chronic comminuted bilateral nasal fractures.       This study was performed with techniques to keep radiation doses as low   as reasonably achievable (ALARA). Individualized dose reduction   techniques using automated exposure control or adjustment of vA and/or   kV according to the patient size were employed.        This report was signed and finalized on 7/30/2025 9:32 AM by Shiv Eaton MD.              LABS: Lab orders shown below, the results are reviewed by myself, and all abnormals are listed below.  Labs Reviewed - No data to display    Vitals Reviewed:    Vitals:    07/30/25 0657 07/30/25 0924 07/30/25 1045 07/30/25 1046   BP: 137/97 131/90 135/94    BP Location: Left arm      Patient Position:  "Sitting      Pulse: 69 64     Resp: 16      Temp: 97.4 °F (36.3 °C)      TempSrc: Oral      SpO2: 100% 100%  99%   Weight: 68.9 kg (152 lb)      Height: 172.7 cm (68\")          MEDICATIONS GIVEN TO PATIENT THIS ENCOUNTER:  Medications   fluorescein ophthalmic strip 1 strip (1 strip Both Eyes Given 7/30/25 0720)   tetracaine (ALTACAINE) 0.5 % ophthalmic solution 2 drop (2 drops Right Eye Given 7/30/25 0720)   erythromycin (ROMYCIN) ophthalmic ointment 1 Application (1 Application Right Eye Given 7/30/25 0970)       CONSULTS:  None    CRITICAL CARE:  There was significant risk of life threatening deterioration of patient's condition requiring my direct management. Critical care time 0 minutes, excluding any documented procedures.    FINAL IMPRESSION      1. Acute loss of vision, right    2. Corneal laceration of right eye, initial encounter          DISPOSITION / PLAN     ED Disposition       ED Disposition   Transfer to Another Facility     Condition   --    Comment   --               PATIENT REFERRED TO:  [address] [address] [phone]  Call today  Try to schedule appointment for today for eye evaluation, I recommend appointment in the next 48 hours      DISCHARGE MEDICATIONS:     Medication List        START taking these medications      erythromycin 5 MG/GM ophthalmic ointment  Commonly known as: ROMYCIN  Administer  to the right eye Every 6 (Six) Hours for 7 days.            CONTINUE taking these medications      amoxicillin-clavulanate 875-125 MG per tablet  Commonly known as: AUGMENTIN  Take 1 tablet by mouth 2 (Two) Times a Day.     HYDROcodone-acetaminophen 5-325 MG per tablet  Commonly known as: NORCO  Take 1 tablet by mouth Every 6 (Six) Hours As Needed for Severe Pain .     * ibuprofen 600 MG tablet  Commonly known as: ADVIL,MOTRIN  Take 1 tablet by mouth Every 6 (Six) Hours As Needed for Mild Pain .     * ibuprofen 800 MG " tablet  Commonly known as: ADVIL,MOTRIN  Take 1 tablet by mouth Every 6 (Six) Hours As Needed for Mild Pain .     ondansetron ODT 4 MG disintegrating tablet  Commonly known as: ZOFRAN-ODT  Take 1 tablet by mouth 4 (Four) Times a Day As Needed for Nausea or Vomiting.           * This list has 2 medication(s) that are the same as other medications prescribed for you. Read the directions carefully, and ask your doctor or other care provider to review them with you.                   Where to Get Your Medications        These medications were sent to [facility] [address] [place] [address] - [address] [place] [address] - [phone]  - [phone]   [address] [facility] [address]      Phone: [phone]   erythromycin 5 MG/GM ophthalmic ointment         Electronically signed by Kalen Machado DO, 07/30/25, 7:06 AM EDT.    Emergency Medicine Physician  [facility]  (Please note that portions of thisnote were completed with a voice recognition program.  Efforts were made to edit the dictations but occasionally words are mis-transcribed.)         Kalen Machado DO  07/30/25 6096

## 2025-07-30 NOTE — DISCHARGE INSTRUCTIONS
If you notice any concerning symptoms, please return to the ER immediately. These can include but are not limited to: worsening of you condition, fevers, chills, shortness of breath, vomiting, weakness of the extremities, changes in your mental status, numbness, pale extremities, or chest pain.     Take medications as prescribed, your pharmacist may have additional recommendations concerning these medications.    For pain use ibuprofen (Motrin) or acetaminophen (Tylenol), unless prescribed medications that also contain these medications.  You can take over the counter acetaminophen or ibuprofen, please follow the directions as dosages differ. Do not take ibuprofen if you have a history of peptic ulcers, kidney disease, bariatric surgery, or are currently pregnant.  Do not take Tylenol if you have a history of liver disease or alcohol use disorder.        THANK YOU!!! From [facility] Emergency Department    On behalf of the Emergency Department staff at [facility], I would like to thank you for giving us the opportunity to address your health care needs and concerns. We hope that during your visit, our service was delivered in a professional and caring manner. Please keep [facility] in mind as we walk with you down the path to your own personal wellness. Please expect follow-up phone calls concerning additional care and questions about your experience.      You have received additional information specific to your diagnosis in these discharge instructions, please read these fully.  Anytime you have been seen in the emergency department we recommend close follow up with your primary care provider or specialist, please follow these directions as indicated.      You need to go straight to [facility] emergency department, you have been accepted there for evaluation by ophthalmology for acute vision loss and concerns for corneal laceration